# Patient Record
Sex: MALE | Race: WHITE | NOT HISPANIC OR LATINO | Employment: OTHER | URBAN - METROPOLITAN AREA
[De-identification: names, ages, dates, MRNs, and addresses within clinical notes are randomized per-mention and may not be internally consistent; named-entity substitution may affect disease eponyms.]

---

## 2019-07-23 ENCOUNTER — TELEPHONE (OUTPATIENT)
Dept: SPORTS MEDICINE | Facility: CLINIC | Age: 49
End: 2019-07-23

## 2019-07-23 ENCOUNTER — HOSPITAL ENCOUNTER (OUTPATIENT)
Dept: RADIOLOGY | Facility: HOSPITAL | Age: 49
Discharge: HOME | End: 2019-07-23
Attending: ORTHOPAEDIC SURGERY
Payer: MEDICAID

## 2019-07-23 ENCOUNTER — TELEPHONE (OUTPATIENT)
Dept: INTERNAL MEDICINE | Facility: CLINIC | Age: 49
End: 2019-07-23

## 2019-07-23 ENCOUNTER — PATIENT MESSAGE (OUTPATIENT)
Dept: SPORTS MEDICINE | Facility: CLINIC | Age: 49
End: 2019-07-23

## 2019-07-23 ENCOUNTER — OFFICE VISIT (OUTPATIENT)
Dept: SPORTS MEDICINE | Facility: CLINIC | Age: 49
End: 2019-07-23
Payer: MEDICAID

## 2019-07-23 VITALS
BODY MASS INDEX: 26.51 KG/M2 | WEIGHT: 179 LBS | HEART RATE: 56 BPM | DIASTOLIC BLOOD PRESSURE: 81 MMHG | SYSTOLIC BLOOD PRESSURE: 125 MMHG | HEIGHT: 69 IN

## 2019-07-23 DIAGNOSIS — E78.5 HYPERLIPIDEMIA, UNSPECIFIED HYPERLIPIDEMIA TYPE: ICD-10-CM

## 2019-07-23 DIAGNOSIS — S83.211A BUCKET-HANDLE TEAR OF MEDIAL MENISCUS OF RIGHT KNEE AS CURRENT INJURY, INITIAL ENCOUNTER: Primary | ICD-10-CM

## 2019-07-23 DIAGNOSIS — Z12.5 PROSTATE CANCER SCREENING: ICD-10-CM

## 2019-07-23 DIAGNOSIS — M25.561 RIGHT KNEE PAIN, UNSPECIFIED CHRONICITY: ICD-10-CM

## 2019-07-23 DIAGNOSIS — Z01.818 PREOPERATIVE CLEARANCE: ICD-10-CM

## 2019-07-23 DIAGNOSIS — I47.10 SVT (SUPRAVENTRICULAR TACHYCARDIA): ICD-10-CM

## 2019-07-23 DIAGNOSIS — Z00.00 ANNUAL PHYSICAL EXAM: Primary | ICD-10-CM

## 2019-07-23 PROCEDURE — 99999 PR PBB SHADOW E&M-NEW PATIENT-LVL III: CPT | Mod: PBBFAC,,, | Performed by: ORTHOPAEDIC SURGERY

## 2019-07-23 PROCEDURE — 99204 OFFICE O/P NEW MOD 45 MIN: CPT | Mod: S$PBB,,, | Performed by: ORTHOPAEDIC SURGERY

## 2019-07-23 PROCEDURE — 99204 PR OFFICE/OUTPT VISIT, NEW, LEVL IV, 45-59 MIN: ICD-10-PCS | Mod: S$PBB,,, | Performed by: ORTHOPAEDIC SURGERY

## 2019-07-23 PROCEDURE — 99203 OFFICE O/P NEW LOW 30 MIN: CPT | Mod: PBBFAC,25,PO | Performed by: ORTHOPAEDIC SURGERY

## 2019-07-23 PROCEDURE — 73564 XR KNEE ORTHO BILAT WITH FLEXION: ICD-10-PCS | Mod: 26,50,, | Performed by: RADIOLOGY

## 2019-07-23 PROCEDURE — 73564 X-RAY EXAM KNEE 4 OR MORE: CPT | Mod: 26,50,, | Performed by: RADIOLOGY

## 2019-07-23 PROCEDURE — 99999 PR PBB SHADOW E&M-NEW PATIENT-LVL III: ICD-10-PCS | Mod: PBBFAC,,, | Performed by: ORTHOPAEDIC SURGERY

## 2019-07-23 PROCEDURE — 73564 X-RAY EXAM KNEE 4 OR MORE: CPT | Mod: TC,50,FY,PO

## 2019-07-23 NOTE — TELEPHONE ENCOUNTER
Please call patient and schedule patient for an appointment with me. Thank you.    See standing lab orders and please draw CMP, Lipids, PSA and CBC - prior to upcoming visit     Please see referral orders and please call patient to schedule a consult with cardiology. Thank you.

## 2019-07-23 NOTE — TELEPHONE ENCOUNTER
Rescheduled pre op to 8/1. Patient would like to reschedule surgery to 8/2. He needs another week to find a primary care physician and get clearance for surgery. I told the patient Dr. Boyd's staff will call him to reschedule surgery date.

## 2019-07-23 NOTE — TELEPHONE ENCOUNTER
----- Message from Emily Peterson sent at 7/23/2019  9:13 AM CDT -----  Good Morning,     The attached patient is shared by you and Dr. Boyd.     The patient is scheduled for an arthroscopic R Knee surgery. Case date is pending medical clearance but it is fairly time sensitive.      Medical clearance is indicated prior to this. Would you be able to help the patient in that regard? The anesthesia team will require documentation in the chart regarding her clearance status and perioperative medical recommendations.    Thank you for your help & let me know if I can assist in any way!    Emily Peterson MA, [facility]  Sports Medicine Assistant to Dr. Boyd

## 2019-07-23 NOTE — PROGRESS NOTES
CC: Right knee pain    49 y.o. Male who presents as a new patient to me. He works as an . He reports that he was rock [place] 6 months ago [place] [employer] & felt a pop in the right knee. Subsequent intermittent pain and mechanical symptoms since then. States the symptoms were tolerable for him & did not seek any further treatment. He then notes a new injury on 6/10/2019 where he forcefully pushed off the right foot & felt immediate pain. Endorses pain and difficulty straightening knee since then due to the feeling of a mechanical block. Saw Dr. Fam Jennings at [facility] who ordered an MRI which revealed a bucket milka medial meniscus tear. He was then referred out due to his insurance coverage. Rogelio has been on crutches for the past 6 weeks with the inability to straighten his knee. His typical pain localizes medially along the joint line. No recurrent effusions. Worse with attempting to straighten the leg or weight bearing. Better with rest. Treatment thus far has included rest, physical therapy, acupuncture, adjustments, activity modifications, oral medications. Here today to discuss diagnosis and treatment options.      PMHx notable for SVT.   Rare tobacco user.   Negative for diabetes.      REVIEW OF SYSTEMS:   Constitution: Negative. Negative for chills, fever and night sweats.    Hematologic/Lymphatic: Negative for bleeding problem. Does not bruise/bleed easily.   Skin: Negative for dry skin, itching and rash.   Musculoskeletal: Negative for falls. Positive for right knee pain and  muscle weakness.     All other review of symptoms were reviewed and found to be noncontributory.     PAST MEDICAL HISTORY:   History reviewed. No pertinent past medical history.    PAST SURGICAL HISTORY:   Past Surgical History:   Procedure Laterality Date    SHOULDER SURGERY Right        FAMILY HISTORY:   Family History   Problem Relation Age of Onset    Heart disease Father     Cancer Maternal  "Grandmother     Cancer Maternal Grandfather         colon       SOCIAL HISTORY:   Social History     Socioeconomic History    Marital status: Significant Other     Spouse name: Not on file    Number of children: Not on file    Years of education: Not on file    Highest education level: Not on file   Occupational History    Occupation: specialty fine arts    Social Needs    Financial resource strain: Not on file    Food insecurity:     Worry: Not on file     Inability: Not on file    Transportation needs:     Medical: Not on file     Non-medical: Not on file   Tobacco Use    Smoking status: Former Smoker    Smokeless tobacco: Never Used   Substance and Sexual Activity    Alcohol use: Not on file    Drug use: Not on file    Sexual activity: Not on file   Lifestyle    Physical activity:     Days per week: Not on file     Minutes per session: Not on file    Stress: Not on file   Relationships    Social connections:     Talks on phone: Not on file     Gets together: Not on file     Attends Yazidi service: Not on file     Active member of club or organization: Not on file     Attends meetings of clubs or organizations: Not on file     Relationship status: Not on file   Other Topics Concern    Not on file   Social History Narrative    Not on file       MEDICATIONS:   No current outpatient medications on file.    ALLERGIES:   Review of patient's allergies indicates:   Allergen Reactions    Pcn [penicillins]         PHYSICAL EXAMINATION:  /81   Pulse (!) 56   Ht 5' 9" (1.753 m)   Wt 81.2 kg (179 lb)   BMI 26.43 kg/m²   General: Well-developed well-nourished 49 y.o. malein no acute distress   Cardiovascular: Regular rhythm by palpation of distal pulse, normal color and temperature, no concerning varicosities on symptomatic side   Lungs: No labored breathing or wheezing appreciated   Neuro: Alert and oriented ×3   Psychiatric: well oriented to person, place and time, demonstrates " normal mood and affect   Skin: No rashes, lesions or ulcers, normal temperature, turgor, and texture on involved extremity    Ortho/SPM Exam  Examination of the right knee demonstrates neutral standing alignment. No effusion. Holds the knee in 40 degrees of flexion. Reports significant pain with a mechanical block on any attempted extension. Flexion to 130 with pain medially. Central patellar tracking. Negative patellar apprehension. Tender along the medial joint line. Negative lateral joint line tenderness. Negative Lachman. Stable to varus and valgus stress at 0 and 30. Negative posterior drawer. NVI distally.     IMAGING:  X-rays including standing, weight bearing AP and flexion bilateral knees, RIGHT knee lateral and sunrise views ordered and images reviewed by me show:    No fracture dislocation bone destruction or OCD seen     External MRI reviewed by me and discussed with patient. Report Reads:   1. Bucket handle medial meniscus tear   2. Grade 2 MCL sprain    3. PF chondral edema & thinning    3. Superficial posterior lateral tibia chondral thinning w/ slight subchondral marrow edema/cystic    4. Moderate central medial femoral chondral loss with mild medial compartment joint space narrowing & mild peripheral medial femoral edema    5. Small effusion & Baker's cyst     The ACL is intact    ASSESSMENT:      ICD-10-CM ICD-9-CM   1. Bucket-handle tear of medial meniscus of right knee as current injury, initial encounter S83.211A 836.0   2. Right knee pain, unspecified chronicity M25.561 719.46       PLAN:     -Findings and treatment options were discussed with the patient, both operative and non operative.  Surgery is recommended for this incarcerated, unstable medial meniscus tear.  -Plan for a Right knee arthroscopic, inside out medial meniscus repair vs partial medial meniscectomy as indicated, chondroplasty. The details of such were discussed to include the expected postop rehab and recovery course.  The  patient understands that even with repair, there is inherent risk for retear and or tissue nonhealing.  The patient also may have secondary chondromalacia and arthritic changes down the road.  This is a chronic tear and he understands that it may not be repairable     -Planned DOS for 7/26/2019 or 8/2/2019 pending clearance.   -Clearance Needed - PCP.   -RTC for preoperative appointment   -All questions answered    Informed Consent:    The details of the surgical procedure were explained, including the location of probable incisions and a description of possible hardware and/or grafts to be used. Alternatives to both operative and non-operative options with associated risks and benefits were discussed. The patient understands the likely convalescence after surgery and, in particular, the expected postop rehab and recovery course. The outlined risks and potential complications of the proposed procedure include but are not limited to: infection, poor wound healing, scarring, deformity, stiffness, swelling, continued or recurrent pain, instability, hardware or prosthetic failure if implanted, symptomatic hardware requiring removal, weakness, neurovascular injury, numbness, chronic regional pain disorder, tissue nonhealing/irreparability/retear, subsequent contralateral limb injury or pathology, chondral injury, arthritis, fracture, blood clot formation, inability to return to previous level of activity, anesthetic or regional block complication up to death, need for additional procedure as indicated intraoperatively, and potential need for further surgery.    The patient was also informed and understands that the risks of surgery are greater for patients with a current condition or history of heart disease, obesity, clotting disorders, recurrent infections, steroid use, current or past smoking, and factors such as sedentary lifestyle and noncompliance with medications, therapy or follow-up. The degree of the increased  risk is hard to estimate with any degree of precision. If applicable, smoking cessation was discussed.     All questions were answered. The patient has verbalized understanding of these issues and wishes to proceed with the surgery as discussed.          Procedures

## 2019-07-24 DIAGNOSIS — M23.203 OLD TEAR OF MEDIAL MENISCUS OF RIGHT KNEE, UNSPECIFIED TEAR TYPE: Primary | ICD-10-CM

## 2019-07-30 ENCOUNTER — TELEPHONE (OUTPATIENT)
Dept: SPORTS MEDICINE | Facility: CLINIC | Age: 49
End: 2019-07-30

## 2019-07-30 NOTE — TELEPHONE ENCOUNTER
I have tried to contact the patient multiple times to inquire about his PCP clearance. Voicemail box is full, cannot leave message. Scheduled for surgery on Friday 8/2.

## 2019-07-30 NOTE — TELEPHONE ENCOUNTER
Spoke with the patient. States he has been cleared. Will bring clearance letter with him to his pre op.

## 2019-07-31 ENCOUNTER — OFFICE VISIT (OUTPATIENT)
Dept: CARDIOLOGY | Facility: CLINIC | Age: 49
End: 2019-07-31
Attending: FAMILY MEDICINE
Payer: MEDICAID

## 2019-07-31 VITALS
OXYGEN SATURATION: 97 % | SYSTOLIC BLOOD PRESSURE: 100 MMHG | HEART RATE: 68 BPM | WEIGHT: 175.5 LBS | BODY MASS INDEX: 25.91 KG/M2 | DIASTOLIC BLOOD PRESSURE: 66 MMHG

## 2019-07-31 DIAGNOSIS — I47.10 SVT (SUPRAVENTRICULAR TACHYCARDIA): Primary | ICD-10-CM

## 2019-07-31 PROCEDURE — 93010 ELECTROCARDIOGRAM REPORT: CPT | Mod: S$PBB,,, | Performed by: INTERNAL MEDICINE

## 2019-07-31 PROCEDURE — 93005 ELECTROCARDIOGRAM TRACING: CPT | Mod: PBBFAC,PN | Performed by: INTERNAL MEDICINE

## 2019-07-31 PROCEDURE — 99203 OFFICE O/P NEW LOW 30 MIN: CPT | Mod: S$PBB,25,, | Performed by: INTERNAL MEDICINE

## 2019-07-31 PROCEDURE — 99213 OFFICE O/P EST LOW 20 MIN: CPT | Mod: PBBFAC,PN | Performed by: INTERNAL MEDICINE

## 2019-07-31 PROCEDURE — 99999 PR PBB SHADOW E&M-EST. PATIENT-LVL III: ICD-10-PCS | Mod: PBBFAC,,, | Performed by: INTERNAL MEDICINE

## 2019-07-31 PROCEDURE — 93010 EKG 12-LEAD: ICD-10-PCS | Mod: S$PBB,,, | Performed by: INTERNAL MEDICINE

## 2019-07-31 PROCEDURE — 99999 PR PBB SHADOW E&M-EST. PATIENT-LVL III: CPT | Mod: PBBFAC,,, | Performed by: INTERNAL MEDICINE

## 2019-07-31 PROCEDURE — 99203 PR OFFICE/OUTPT VISIT, NEW, LEVL III, 30-44 MIN: ICD-10-PCS | Mod: S$PBB,25,, | Performed by: INTERNAL MEDICINE

## 2019-07-31 RX ORDER — IBUPROFEN 800 MG/1
800 TABLET ORAL 3 TIMES DAILY
COMMUNITY

## 2019-07-31 NOTE — LETTER
July 31, 2019      Joshua Fine MD  [address]  [facility] [address]           [facility]  [address], [facility] [address]  [facility] [address]  Phone: [phone]  Fax: [phone]          Patient: Jv Rankin   MR Number: [id number]   YOB: 1970   Date of Visit: 7/31/2019       Dear Dr. Joshua Fine:    Thank you for referring Jv Rankin to me for evaluation. Attached you will find relevant portions of my assessment and plan of care.    If you have questions, please do not hesitate to call me. I look forward to following Jv Rankin along with you.    Sincerely,    Hever Mckeon MD    Enclosure  CC:  No Recipients    If you would like to receive this communication electronically, please contact [email or web address] or [phone] to request more information on [facility] access.    For providers and/or their staff who would like to refer a patient to [facility], please contact us through our one-stop-shop provider referral line, [facility], at [phone].    If you feel you have received this communication in error or would no longer like to receive these types of communications, please e-mail [email or web address]

## 2019-07-31 NOTE — PROGRESS NOTES
Subjective:    Patient ID:  Jv Rankin is a 49 y.o. male who presents for follow-up of Consult      HPI     The patient is a 49 year old male with a history of SVT and was seen by Emil Chavez and Babatunde in 2016. He has continued to do well and is having less frequent SVT episodes. He has no associated chest pain or DOVE but may have light headedness. These episodes occur 2-3 times a month. He now would like to consider ablation. EKG is normal.        CONCLUSIONS     1 - Normal left ventricular systolic function (EF 60-65%).     2 - Normal right ventricular systolic function .     No evidence of stress induced myocardial ischemia.         This document has been electronically    SIGNED BY: Cain Lepe MD On: 06/07/2016 14:  Lab Results   Component Value Date     06/07/2016    K 4.9 06/07/2016     06/07/2016    CO2 29 06/07/2016    BUN 16 06/07/2016    CREATININE 0.8 06/07/2016    GLU 91 06/07/2016    AST 23 06/07/2016    ALT 20 06/07/2016    ALBUMIN 3.9 06/07/2016    PROT 6.6 06/07/2016    BILITOT 0.7 06/07/2016    WBC 7.60 06/07/2016    HGB 15.4 06/07/2016    HCT 45.2 06/07/2016    MCV 88 06/07/2016     06/07/2016    TSH 1.763 06/07/2016         Lab Results   Component Value Date    CHOL 156 06/07/2016    HDL 66 06/07/2016    TRIG 34 06/07/2016       Lab Results   Component Value Date    LDLCALC 83.2 06/07/2016       No past medical history on file.    Current Outpatient Medications:     ibuprofen (ADVIL,MOTRIN) 800 MG tablet, Take 800 mg by mouth 3 (three) times daily., Disp: , Rfl:           Review of Systems   Constitution: Negative for decreased appetite, diaphoresis, fever, malaise/fatigue, weight gain and weight loss.   HENT: Negative for congestion, ear discharge, ear pain and nosebleeds.    Eyes: Negative for blurred vision, double vision and visual disturbance.   Cardiovascular: Positive for palpitations. Negative for chest pain, claudication, cyanosis, dyspnea on exertion, irregular  heartbeat, leg swelling, near-syncope, orthopnea, paroxysmal nocturnal dyspnea and syncope.   Respiratory: Negative for cough, hemoptysis, shortness of breath, sleep disturbances due to breathing, snoring, sputum production and wheezing.    Endocrine: Negative for polydipsia, polyphagia and polyuria.   Hematologic/Lymphatic: Negative for adenopathy and bleeding problem. Does not bruise/bleed easily.   Skin: Negative for color change, nail changes, poor wound healing and rash.   Musculoskeletal: Positive for joint pain (right knee). Negative for muscle cramps and muscle weakness.   Gastrointestinal: Negative for abdominal pain, anorexia, change in bowel habit, hematochezia, nausea and vomiting.   Genitourinary: Negative for dysuria, frequency and hematuria.   Neurological: Negative for brief paralysis, difficulty with concentration, excessive daytime sleepiness, dizziness, focal weakness, headaches, light-headedness, seizures, vertigo and weakness.   Psychiatric/Behavioral: Negative for altered mental status and depression.   Allergic/Immunologic: Negative for persistent infections.        Objective:/66 (BP Location: Right arm, Patient Position: Sitting, BP Method: Medium (Automatic))   Pulse 68   Wt 79.6 kg (175 lb 7.8 oz)   SpO2 97%   BMI 25.91 kg/m²             Physical Exam   Constitutional: He is oriented to person, place, and time. He appears well-developed and well-nourished.   HENT:   Head: Normocephalic.   Right Ear: External ear normal.   Left Ear: External ear normal.   Nose: Nose normal.   Inspection of lips, teeth and gums normal   Eyes: Pupils are equal, round, and reactive to light. EOM are normal. No scleral icterus.   Neck: Normal range of motion. Neck supple. No JVD present. No tracheal deviation present. No thyromegaly present.   Cardiovascular: Normal rate, regular rhythm and intact distal pulses. Exam reveals no gallop and no friction rub.   No murmur heard.  Pulses:       Carotid  pulses are 2+ on the right side, and 2+ on the left side.       Dorsalis pedis pulses are 2+ on the right side, and 2+ on the left side.        Posterior tibial pulses are 2+ on the right side, and 2+ on the left side.   Pulmonary/Chest: Effort normal and breath sounds normal.   Abdominal: Bowel sounds are normal. He exhibits no distension. There is no hepatosplenomegaly. There is no tenderness. There is no guarding.   Musculoskeletal: Normal range of motion. He exhibits no edema or tenderness.   Lymphadenopathy:   Palpation of neck and groin lymph nodes normal   Neurological: He is alert and oriented to person, place, and time. No cranial nerve deficit. He exhibits normal muscle tone. Coordination normal.   Skin: Skin is dry.        Palpation of skin normal   Psychiatric: His behavior is normal. Judgment and thought content normal.         Assessment:       1. SVT (supraventricular tachycardia)         Plan:       Jv was seen today for consult.    Diagnoses and all orders for this visit:    SVT (supraventricular tachycardia)  -     IN OFFICE EKG 12-LEAD (to [place])  -     Ambulatory Referral to Electrophysiology    Other orders  -     ibuprofen (ADVIL,MOTRIN) 800 MG tablet; Take 800 mg by mouth 3 (three) times daily.    Low CV risk for planned knee surgery

## 2019-08-01 ENCOUNTER — ANESTHESIA EVENT (OUTPATIENT)
Dept: SURGERY | Facility: HOSPITAL | Age: 49
End: 2019-08-01
Payer: MEDICAID

## 2019-08-01 ENCOUNTER — OFFICE VISIT (OUTPATIENT)
Dept: SPORTS MEDICINE | Facility: CLINIC | Age: 49
End: 2019-08-01
Payer: MEDICAID

## 2019-08-01 VITALS
DIASTOLIC BLOOD PRESSURE: 69 MMHG | HEART RATE: 63 BPM | BODY MASS INDEX: 25.92 KG/M2 | HEIGHT: 69 IN | WEIGHT: 175 LBS | SYSTOLIC BLOOD PRESSURE: 111 MMHG

## 2019-08-01 DIAGNOSIS — S83.211D BUCKET-HANDLE TEAR OF MEDIAL MENISCUS OF RIGHT KNEE AS CURRENT INJURY, SUBSEQUENT ENCOUNTER: Primary | ICD-10-CM

## 2019-08-01 PROCEDURE — 99499 NO LOS: ICD-10-PCS | Mod: S$PBB,,, | Performed by: PHYSICIAN ASSISTANT

## 2019-08-01 PROCEDURE — 99999 PR PBB SHADOW E&M-EST. PATIENT-LVL III: CPT | Mod: PBBFAC,,, | Performed by: PHYSICIAN ASSISTANT

## 2019-08-01 PROCEDURE — 99499 UNLISTED E&M SERVICE: CPT | Mod: S$PBB,,, | Performed by: PHYSICIAN ASSISTANT

## 2019-08-01 PROCEDURE — 99213 OFFICE O/P EST LOW 20 MIN: CPT | Mod: PBBFAC,PO | Performed by: PHYSICIAN ASSISTANT

## 2019-08-01 PROCEDURE — 99999 PR PBB SHADOW E&M-EST. PATIENT-LVL III: ICD-10-PCS | Mod: PBBFAC,,, | Performed by: PHYSICIAN ASSISTANT

## 2019-08-01 RX ORDER — ASPIRIN 81 MG/1
81 TABLET ORAL 2 TIMES DAILY
Qty: 28 TABLET | Refills: 0 | Status: SHIPPED | OUTPATIENT
Start: 2019-08-01 | End: 2019-08-15

## 2019-08-01 RX ORDER — OXYCODONE AND ACETAMINOPHEN 5; 325 MG/1; MG/1
1 TABLET ORAL EVERY 6 HOURS PRN
Qty: 21 TABLET | Refills: 0 | Status: SHIPPED | OUTPATIENT
Start: 2019-08-01 | End: 2019-08-05 | Stop reason: SDUPTHER

## 2019-08-01 RX ORDER — OXYCODONE HYDROCHLORIDE 5 MG/1
5 TABLET ORAL EVERY 6 HOURS PRN
Qty: 21 TABLET | Refills: 0 | Status: SHIPPED | OUTPATIENT
Start: 2019-08-01 | End: 2019-08-01 | Stop reason: ALTCHOICE

## 2019-08-01 RX ORDER — ONDANSETRON 4 MG/1
4 TABLET, FILM COATED ORAL EVERY 8 HOURS PRN
Qty: 30 TABLET | Refills: 0 | Status: SHIPPED | OUTPATIENT
Start: 2019-08-01 | End: 2019-08-08

## 2019-08-01 RX ORDER — SODIUM CHLORIDE 9 MG/ML
INJECTION, SOLUTION INTRAVENOUS CONTINUOUS
Status: CANCELLED | OUTPATIENT
Start: 2019-08-01

## 2019-08-01 NOTE — H&P (VIEW-ONLY)
"Jv Rankin  is here for a completion of his perioperative paperwork. he  Is scheduled to undergo  Right knee arthroscopic, inside out medial meniscus repair vs partial medial meniscectomy as indicated, chondroplasty on 8/2/2019.  He is a healthy individual and does need clearance for this procedure.     Jennifer Wilson, CULLENP-C, [facility], stated that " he is medically clear to have surgery. This clearance is based on a physical examination with labs and is pending final approval from cardiology due to an arrhythmia. "    Pending clearance by Dr. Mckeon, cardiologist.    Risks, indications and benefits of the surgical procedure were discussed with the patient. All questions with regard to surgery, rehab, expected return to functional activities, activities of daily living and recreational endeavors were answered to his satisfaction.    Patient was informed and understands the risks of surgery are greater for patients with a current condition or hx of heart disease, obesity, clotting disorders, recurrent infections, steroid use, current or past smoking, and factors such as sedentary lifestyle and noncompliance with medications, therapy or f/u. The degree of the increased risk is hard to estimate w/ any degree of precision.    Once no other questions were asked, a brief history and physical exam was then performed.    PAST MEDICAL HISTORY: History reviewed. No pertinent past medical history.  PAST SURGICAL HISTORY:   Past Surgical History:   Procedure Laterality Date    SHOULDER SURGERY Right      FAMILY HISTORY:   Family History   Problem Relation Age of Onset    Heart disease Father     Cancer Maternal Grandmother     Cancer Maternal Grandfather         colon     SOCIAL HISTORY:   Social History     Socioeconomic History    Marital status: Significant Other     Spouse name: Not on file    Number of children: Not on file    Years of education: Not on file    Highest education level: Not on file "   Occupational History    Occupation: specialty fine arts    Social Needs    Financial resource strain: Not on file    Food insecurity:     Worry: Not on file     Inability: Not on file    Transportation needs:     Medical: Not on file     Non-medical: Not on file   Tobacco Use    Smoking status: Former Smoker    Smokeless tobacco: Never Used   Substance and Sexual Activity    Alcohol use: Not on file    Drug use: Not on file    Sexual activity: Not on file   Lifestyle    Physical activity:     Days per week: Not on file     Minutes per session: Not on file    Stress: Not on file   Relationships    Social connections:     Talks on phone: Not on file     Gets together: Not on file     Attends Presybeterian service: Not on file     Active member of club or organization: Not on file     Attends meetings of clubs or organizations: Not on file     Relationship status: Not on file   Other Topics Concern    Not on file   Social History Narrative    Not on file       MEDICATIONS:   Current Outpatient Medications:     ibuprofen (ADVIL,MOTRIN) 800 MG tablet, Take 800 mg by mouth 3 (three) times daily., Disp: , Rfl:   ALLERGIES:   Review of patient's allergies indicates:   Allergen Reactions    Pcn [penicillins]        Review of Systems   Constitution: Negative. Negative for chills, fever and night sweats.   HENT: Negative for congestion and headaches.    Eyes: Negative for blurred vision, left vision loss and right vision loss.   Cardiovascular: Negative for chest pain and syncope.   Respiratory: Negative for cough and shortness of breath.    Endocrine: Negative for polydipsia, polyphagia and polyuria.   Hematologic/Lymphatic: Negative for bleeding problem. Does not bruise/bleed easily.   Skin: Negative for dry skin, itching and rash.   Musculoskeletal: Negative for falls and muscle weakness.   Gastrointestinal: Negative for abdominal pain and bowel incontinence.   Genitourinary: Negative for bladder  incontinence and nocturia.   Neurological: Negative for disturbances in coordination, loss of balance and seizures.   Psychiatric/Behavioral: Negative for depression. The patient does not have insomnia.    Allergic/Immunologic: Negative for hives and persistent infections.     PHYSICAL EXAM:  GEN: A&Ox3, WD WN NAD  HEENT: WNL  CHEST: CTAB, no W/R/R  HEART: RRR, no M/R/G   ABD: Soft, NT ND, BS x4 QUADS  MS: Refer to previous note for detailed MS exam  NEURO: CN II-XII intact       The surgical consent was then reviewed with the patient, who agreed with all the contents of the consent form and it was signed.     PHYSICAL THERAPY:  He was also instructed regarding physical therapy and will begin on POD#1-3 at [facility].    POST OP CARE: Instructions were reviewed including care of the wound and dressing after surgery and when he can shower.     PAIN MANAGEMENT: Jv Rankin was instructed regarding the Polar ice unit that will be in place after surgery and his postoperative pain medications.     MEDICATION:  Percocet 5mg mg 1 q 6 hours PRN for pain (pharmacy did not carry roxicodone)  Zofran 4 mg q 8 hours PRN for nausea and vomiting.  Aspirin 81mg BID x 2 weeks for DVT prophylaxis starting on the evening after surgery.      Patient was instructed to contact us immediately if they are unable to  mediations prior to surgery.     Patient was instructed to purchase and take Colace to counter possible GI side effects of taking opiates.     DVT prophylaxis was discussed with the patient today including risk factors for developing DVTs and history of DVTs. The patient was asked if any specific recommendations were given from the doctor/s that did pre-operative surgical clearance.      If the patient was previously taking 81mg baby aspirin, they were told to not take additional baby aspirin, using the above stated aspirin and to restart the 81mg aspirin daily after completion of the aspirin dose.      Patient was  also told to buy over the counter Prilosec medication and take it once daily for GI protection as long as they are taking NSAIDs or Aspirin.     The patient was told that narcotic pain medications may make them drowsy and instructions were given to not sign legal documents, drive or operate heavy machinery, cars, or equipment while under the influence of narcotic medications.     Dr. Boyd was not present in clinic today. However, patient had no further questions for myself or Dr. Boyd.     As there were no other questions to be asked, he was given my business card along with Dr. Boyd's business card if he has any questions or concerns prior to surgery or in the postop period.

## 2019-08-01 NOTE — H&P
"Jv Rankin  is here for a completion of his perioperative paperwork. he  Is scheduled to undergo  Right knee arthroscopic, inside out medial meniscus repair vs partial medial meniscectomy as indicated, chondroplasty on 8/2/2019.  He is a healthy individual and does need clearance for this procedure.     Jennifer Wilson, CULLENP-C, [facility], [facility], stated that " he is medically clear to have surgery. This clearance is based on a physical examination with labs and is pending final approval from cardiology due to an arrhythmia. "    Pending clearance by Dr. Mckeon, cardiologist.    Risks, indications and benefits of the surgical procedure were discussed with the patient. All questions with regard to surgery, rehab, expected return to functional activities, activities of daily living and recreational endeavors were answered to his satisfaction.    Patient was informed and understands the risks of surgery are greater for patients with a current condition or hx of heart disease, obesity, clotting disorders, recurrent infections, steroid use, current or past smoking, and factors such as sedentary lifestyle and noncompliance with medications, therapy or f/u. The degree of the increased risk is hard to estimate w/ any degree of precision.    Once no other questions were asked, a brief history and physical exam was then performed.    PAST MEDICAL HISTORY: History reviewed. No pertinent past medical history.  PAST SURGICAL HISTORY:   Past Surgical History:   Procedure Laterality Date    SHOULDER SURGERY Right      FAMILY HISTORY:   Family History   Problem Relation Age of Onset    Heart disease Father     Cancer Maternal Grandmother     Cancer Maternal Grandfather         colon     SOCIAL HISTORY:   Social History     Socioeconomic History    Marital status: Significant Other     Spouse name: Not on file    Number of children: Not on file    Years of education: Not on file    Highest education level: Not on file "   Occupational History    Occupation: specialty fine arts    Social Needs    Financial resource strain: Not on file    Food insecurity:     Worry: Not on file     Inability: Not on file    Transportation needs:     Medical: Not on file     Non-medical: Not on file   Tobacco Use    Smoking status: Former Smoker    Smokeless tobacco: Never Used   Substance and Sexual Activity    Alcohol use: Not on file    Drug use: Not on file    Sexual activity: Not on file   Lifestyle    Physical activity:     Days per week: Not on file     Minutes per session: Not on file    Stress: Not on file   Relationships    Social connections:     Talks on phone: Not on file     Gets together: Not on file     Attends Worship service: Not on file     Active member of club or organization: Not on file     Attends meetings of clubs or organizations: Not on file     Relationship status: Not on file   Other Topics Concern    Not on file   Social History Narrative    Not on file       MEDICATIONS:   Current Outpatient Medications:     ibuprofen (ADVIL,MOTRIN) 800 MG tablet, Take 800 mg by mouth 3 (three) times daily., Disp: , Rfl:   ALLERGIES:   Review of patient's allergies indicates:   Allergen Reactions    Pcn [penicillins]        Review of Systems   Constitution: Negative. Negative for chills, fever and night sweats.   HENT: Negative for congestion and headaches.    Eyes: Negative for blurred vision, left vision loss and right vision loss.   Cardiovascular: Negative for chest pain and syncope.   Respiratory: Negative for cough and shortness of breath.    Endocrine: Negative for polydipsia, polyphagia and polyuria.   Hematologic/Lymphatic: Negative for bleeding problem. Does not bruise/bleed easily.   Skin: Negative for dry skin, itching and rash.   Musculoskeletal: Negative for falls and muscle weakness.   Gastrointestinal: Negative for abdominal pain and bowel incontinence.   Genitourinary: Negative for bladder  incontinence and nocturia.   Neurological: Negative for disturbances in coordination, loss of balance and seizures.   Psychiatric/Behavioral: Negative for depression. The patient does not have insomnia.    Allergic/Immunologic: Negative for hives and persistent infections.     PHYSICAL EXAM:  GEN: A&Ox3, WD WN NAD  HEENT: WNL  CHEST: CTAB, no W/R/R  HEART: RRR, no M/R/G   ABD: Soft, NT ND, BS x4 QUADS  MS: Refer to previous note for detailed MS exam  NEURO: CN II-XII intact       The surgical consent was then reviewed with the patient, who agreed with all the contents of the consent form and it was signed.     PHYSICAL THERAPY:  He was also instructed regarding physical therapy and will begin on POD#1-3 at [facility].    POST OP CARE: Instructions were reviewed including care of the wound and dressing after surgery and when he can shower.     PAIN MANAGEMENT: Jv Rankin was instructed regarding the Polar ice unit that will be in place after surgery and his postoperative pain medications.     MEDICATION:  Percocet 5mg mg 1 q 6 hours PRN for pain (pharmacy did not carry roxicodone)  Zofran 4 mg q 8 hours PRN for nausea and vomiting.  Aspirin 81mg BID x 2 weeks for DVT prophylaxis starting on the evening after surgery.      Patient was instructed to contact us immediately if they are unable to  mediations prior to surgery.     Patient was instructed to purchase and take Colace to counter possible GI side effects of taking opiates.     DVT prophylaxis was discussed with the patient today including risk factors for developing DVTs and history of DVTs. The patient was asked if any specific recommendations were given from the doctor/s that did pre-operative surgical clearance.      If the patient was previously taking 81mg baby aspirin, they were told to not take additional baby aspirin, using the above stated aspirin and to restart the 81mg aspirin daily after completion of the aspirin dose.      Patient was  also told to buy over the counter Prilosec medication and take it once daily for GI protection as long as they are taking NSAIDs or Aspirin.     The patient was told that narcotic pain medications may make them drowsy and instructions were given to not sign legal documents, drive or operate heavy machinery, cars, or equipment while under the influence of narcotic medications.     Dr. Boyd was not present in clinic today. However, patient had no further questions for myself or Dr. Boyd.     As there were no other questions to be asked, he was given my business card along with Dr. Boyd's business card if he has any questions or concerns prior to surgery or in the postop period.

## 2019-08-02 ENCOUNTER — ANESTHESIA (OUTPATIENT)
Dept: SURGERY | Facility: HOSPITAL | Age: 49
End: 2019-08-02
Payer: MEDICAID

## 2019-08-02 ENCOUNTER — PATIENT MESSAGE (OUTPATIENT)
Dept: SPORTS MEDICINE | Facility: CLINIC | Age: 49
End: 2019-08-02

## 2019-08-02 ENCOUNTER — HOSPITAL ENCOUNTER (OUTPATIENT)
Facility: HOSPITAL | Age: 49
Discharge: HOME | End: 2019-08-02
Attending: ORTHOPAEDIC SURGERY | Admitting: ORTHOPAEDIC SURGERY
Payer: MEDICAID

## 2019-08-02 VITALS
OXYGEN SATURATION: 100 % | BODY MASS INDEX: 26.07 KG/M2 | SYSTOLIC BLOOD PRESSURE: 110 MMHG | HEIGHT: 69 IN | HEART RATE: 62 BPM | DIASTOLIC BLOOD PRESSURE: 70 MMHG | RESPIRATION RATE: 20 BRPM | TEMPERATURE: 98 F | WEIGHT: 176 LBS

## 2019-08-02 DIAGNOSIS — S83.211A BUCKET-HANDLE TEAR OF MEDIAL MENISCUS OF RIGHT KNEE AS CURRENT INJURY, INITIAL ENCOUNTER: Primary | ICD-10-CM

## 2019-08-02 DIAGNOSIS — S83.211D BUCKET-HANDLE TEAR OF MEDIAL MENISCUS OF RIGHT KNEE AS CURRENT INJURY, SUBSEQUENT ENCOUNTER: ICD-10-CM

## 2019-08-02 PROCEDURE — 37000008 HC ANESTHESIA 1ST 15 MINUTES: Performed by: ORTHOPAEDIC SURGERY

## 2019-08-02 PROCEDURE — 76942 ECHO GUIDE FOR BIOPSY: CPT | Performed by: SURGERY

## 2019-08-02 PROCEDURE — D9220A PRA ANESTHESIA: Mod: ANES,,, | Performed by: ANESTHESIOLOGY

## 2019-08-02 PROCEDURE — 36000710: Performed by: ORTHOPAEDIC SURGERY

## 2019-08-02 PROCEDURE — 63600175 PHARM REV CODE 636 W HCPCS: Performed by: PHYSICIAN ASSISTANT

## 2019-08-02 PROCEDURE — 63600175 PHARM REV CODE 636 W HCPCS: Performed by: NURSE ANESTHETIST, CERTIFIED REGISTERED

## 2019-08-02 PROCEDURE — 27200750 HC INSULATED NEEDLE/ STIMUPLEX: Performed by: SURGERY

## 2019-08-02 PROCEDURE — 29882 ARTHRS KNE SRG MNISC RPR M/L: CPT | Mod: 22,RT,, | Performed by: ORTHOPAEDIC SURGERY

## 2019-08-02 PROCEDURE — 64448 NJX AA&/STRD FEM NRV NFS IMG: CPT | Mod: 59,RT,, | Performed by: ANESTHESIOLOGY

## 2019-08-02 PROCEDURE — 64448 NJX AA&/STRD FEM NRV NFS IMG: CPT | Performed by: SURGERY

## 2019-08-02 PROCEDURE — 27201423 OPTIME MED/SURG SUP & DEVICES STERILE SUPPLY: Performed by: ORTHOPAEDIC SURGERY

## 2019-08-02 PROCEDURE — 27800517 HC TRAY,EPIDURAL-CONTINUOUS: Performed by: SURGERY

## 2019-08-02 PROCEDURE — 63600175 PHARM REV CODE 636 W HCPCS

## 2019-08-02 PROCEDURE — 63600175 PHARM REV CODE 636 W HCPCS: Performed by: ANESTHESIOLOGY

## 2019-08-02 PROCEDURE — 76942 IPACK SINGLE INJECTION BLOCK: ICD-10-PCS | Mod: 26,,, | Performed by: ANESTHESIOLOGY

## 2019-08-02 PROCEDURE — 29882 PR KNEE SCOPE,MED OR LAT MENIS REPAIR: ICD-10-PCS | Mod: 22,RT,, | Performed by: ORTHOPAEDIC SURGERY

## 2019-08-02 PROCEDURE — 71000044 HC DOSC ROUTINE RECOVERY FIRST HOUR: Performed by: ORTHOPAEDIC SURGERY

## 2019-08-02 PROCEDURE — 37000009 HC ANESTHESIA EA ADD 15 MINS: Performed by: ORTHOPAEDIC SURGERY

## 2019-08-02 PROCEDURE — 64448 ADDUCTOR CANAL: ICD-10-PCS | Mod: 59,RT,, | Performed by: ANESTHESIOLOGY

## 2019-08-02 PROCEDURE — 29881 PR KNEE SCOPE SINGLE MENISECECTOMY: ICD-10-PCS | Mod: 59,51,RT, | Performed by: ORTHOPAEDIC SURGERY

## 2019-08-02 PROCEDURE — 71000015 HC POSTOP RECOV 1ST HR: Performed by: ORTHOPAEDIC SURGERY

## 2019-08-02 PROCEDURE — 64450 IPACK SINGLE INJECTION BLOCK: ICD-10-PCS | Mod: 59,RT,, | Performed by: ANESTHESIOLOGY

## 2019-08-02 PROCEDURE — 01400 ANES OPN/ARTHRS KNEE JT NOS: CPT | Performed by: ORTHOPAEDIC SURGERY

## 2019-08-02 PROCEDURE — 29881 ARTHRS KNE SRG MNISECTMY M/L: CPT | Mod: 59,51,RT, | Performed by: ORTHOPAEDIC SURGERY

## 2019-08-02 PROCEDURE — 63600175 PHARM REV CODE 636 W HCPCS: Performed by: ORTHOPAEDIC SURGERY

## 2019-08-02 PROCEDURE — 71000016 HC POSTOP RECOV ADDL HR: Performed by: ORTHOPAEDIC SURGERY

## 2019-08-02 PROCEDURE — C1713 ANCHOR/SCREW BN/BN,TIS/BN: HCPCS | Performed by: ORTHOPAEDIC SURGERY

## 2019-08-02 PROCEDURE — 25000003 PHARM REV CODE 250: Performed by: NURSE ANESTHETIST, CERTIFIED REGISTERED

## 2019-08-02 PROCEDURE — D9220A PRA ANESTHESIA: Mod: CRNA,,, | Performed by: NURSE ANESTHETIST, CERTIFIED REGISTERED

## 2019-08-02 PROCEDURE — 76942 ECHO GUIDE FOR BIOPSY: CPT | Mod: 26,,, | Performed by: ANESTHESIOLOGY

## 2019-08-02 PROCEDURE — 64450 NJX AA&/STRD OTHER PN/BRANCH: CPT | Mod: 59,RT,, | Performed by: ANESTHESIOLOGY

## 2019-08-02 PROCEDURE — D9220A PRA ANESTHESIA: ICD-10-PCS | Mod: ANES,,, | Performed by: ANESTHESIOLOGY

## 2019-08-02 PROCEDURE — D9220A PRA ANESTHESIA: ICD-10-PCS | Mod: CRNA,,, | Performed by: NURSE ANESTHETIST, CERTIFIED REGISTERED

## 2019-08-02 PROCEDURE — 63600175 PHARM REV CODE 636 W HCPCS: Performed by: SURGERY

## 2019-08-02 PROCEDURE — 36000711: Performed by: ORTHOPAEDIC SURGERY

## 2019-08-02 RX ORDER — PROPOFOL 10 MG/ML
VIAL (ML) INTRAVENOUS
Status: DISCONTINUED | OUTPATIENT
Start: 2019-08-02 | End: 2019-08-02

## 2019-08-02 RX ORDER — FENTANYL CITRATE 50 UG/ML
INJECTION, SOLUTION INTRAMUSCULAR; INTRAVENOUS
Status: DISCONTINUED | OUTPATIENT
Start: 2019-08-02 | End: 2019-08-02

## 2019-08-02 RX ORDER — MIDAZOLAM HYDROCHLORIDE 1 MG/ML
INJECTION INTRAMUSCULAR; INTRAVENOUS
Status: DISCONTINUED
Start: 2019-08-02 | End: 2019-08-02 | Stop reason: HOSPADM

## 2019-08-02 RX ORDER — FENTANYL CITRATE 50 UG/ML
INJECTION, SOLUTION INTRAMUSCULAR; INTRAVENOUS
Status: COMPLETED
Start: 2019-08-02 | End: 2019-08-02

## 2019-08-02 RX ORDER — SODIUM CHLORIDE 0.9 % (FLUSH) 0.9 %
10 SYRINGE (ML) INJECTION
Status: DISCONTINUED | OUTPATIENT
Start: 2019-08-02 | End: 2019-08-02 | Stop reason: HOSPADM

## 2019-08-02 RX ORDER — SODIUM CHLORIDE 9 MG/ML
INJECTION, SOLUTION INTRAVENOUS CONTINUOUS
Status: DISCONTINUED | OUTPATIENT
Start: 2019-08-02 | End: 2019-08-02 | Stop reason: HOSPADM

## 2019-08-02 RX ORDER — LIDOCAINE HYDROCHLORIDE 10 MG/ML
INJECTION, SOLUTION EPIDURAL; INFILTRATION; INTRACAUDAL; PERINEURAL
Status: DISCONTINUED
Start: 2019-08-02 | End: 2019-08-02 | Stop reason: HOSPADM

## 2019-08-02 RX ORDER — VANCOMYCIN HCL IN 5 % DEXTROSE 1G/250ML
1000 PLASTIC BAG, INJECTION (ML) INTRAVENOUS
Status: COMPLETED | OUTPATIENT
Start: 2019-08-02 | End: 2019-08-02

## 2019-08-02 RX ORDER — FENTANYL CITRATE 50 UG/ML
INJECTION, SOLUTION INTRAMUSCULAR; INTRAVENOUS
Status: DISCONTINUED
Start: 2019-08-02 | End: 2019-08-02 | Stop reason: WASHOUT

## 2019-08-02 RX ORDER — DEXAMETHASONE SODIUM PHOSPHATE 4 MG/ML
INJECTION, SOLUTION INTRA-ARTICULAR; INTRALESIONAL; INTRAMUSCULAR; INTRAVENOUS; SOFT TISSUE
Status: DISCONTINUED | OUTPATIENT
Start: 2019-08-02 | End: 2019-08-02

## 2019-08-02 RX ORDER — EPINEPHRINE 1 MG/ML
INJECTION INTRAMUSCULAR; INTRAVENOUS; SUBCUTANEOUS
Status: DISCONTINUED | OUTPATIENT
Start: 2019-08-02 | End: 2019-08-02 | Stop reason: HOSPADM

## 2019-08-02 RX ORDER — MIDAZOLAM HYDROCHLORIDE 1 MG/ML
INJECTION INTRAMUSCULAR; INTRAVENOUS
Status: COMPLETED
Start: 2019-08-02 | End: 2019-08-02

## 2019-08-02 RX ORDER — FENTANYL CITRATE 50 UG/ML
25 INJECTION, SOLUTION INTRAMUSCULAR; INTRAVENOUS EVERY 10 MIN PRN
Status: DISCONTINUED | OUTPATIENT
Start: 2019-08-02 | End: 2019-08-02 | Stop reason: HOSPADM

## 2019-08-02 RX ORDER — ONDANSETRON 2 MG/ML
4 INJECTION INTRAMUSCULAR; INTRAVENOUS ONCE AS NEEDED
Status: DISCONTINUED | OUTPATIENT
Start: 2019-08-02 | End: 2019-08-02 | Stop reason: HOSPADM

## 2019-08-02 RX ORDER — MIDAZOLAM HYDROCHLORIDE 1 MG/ML
4 INJECTION INTRAMUSCULAR; INTRAVENOUS ONCE
Status: COMPLETED | OUTPATIENT
Start: 2019-08-02 | End: 2019-08-02

## 2019-08-02 RX ORDER — ONDANSETRON 2 MG/ML
INJECTION INTRAMUSCULAR; INTRAVENOUS
Status: DISCONTINUED | OUTPATIENT
Start: 2019-08-02 | End: 2019-08-02

## 2019-08-02 RX ORDER — GLYCOPYRROLATE 0.2 MG/ML
INJECTION INTRAMUSCULAR; INTRAVENOUS
Status: DISCONTINUED | OUTPATIENT
Start: 2019-08-02 | End: 2019-08-02

## 2019-08-02 RX ORDER — LIDOCAINE HCL/PF 100 MG/5ML
SYRINGE (ML) INTRAVENOUS
Status: DISCONTINUED | OUTPATIENT
Start: 2019-08-02 | End: 2019-08-02

## 2019-08-02 RX ADMIN — DEXAMETHASONE SODIUM PHOSPHATE 4 MG: 4 INJECTION, SOLUTION INTRAMUSCULAR; INTRAVENOUS at 09:08

## 2019-08-02 RX ADMIN — LIDOCAINE HYDROCHLORIDE 70 MG: 20 INJECTION, SOLUTION INTRAVENOUS at 09:08

## 2019-08-02 RX ADMIN — MIDAZOLAM HYDROCHLORIDE 4 MG: 1 INJECTION, SOLUTION INTRAMUSCULAR; INTRAVENOUS at 08:08

## 2019-08-02 RX ADMIN — SODIUM CHLORIDE, SODIUM GLUCONATE, SODIUM ACETATE, POTASSIUM CHLORIDE, MAGNESIUM CHLORIDE, SODIUM PHOSPHATE, DIBASIC, AND POTASSIUM PHOSPHATE: .53; .5; .37; .037; .03; .012; .00082 INJECTION, SOLUTION INTRAVENOUS at 11:08

## 2019-08-02 RX ADMIN — Medication 1000 MG: at 08:08

## 2019-08-02 RX ADMIN — FENTANYL CITRATE 100 MCG: 50 INJECTION, SOLUTION INTRAMUSCULAR; INTRAVENOUS at 09:08

## 2019-08-02 RX ADMIN — ROPIVACAINE HYDROCHLORIDE: 2 INJECTION, SOLUTION EPIDURAL; INFILTRATION at 12:08

## 2019-08-02 RX ADMIN — GLYCOPYRROLATE 0.2 MG: 0.2 INJECTION, SOLUTION INTRAMUSCULAR; INTRAVENOUS at 09:08

## 2019-08-02 RX ADMIN — FENTANYL CITRATE 25 MCG: 50 INJECTION INTRAMUSCULAR; INTRAVENOUS at 12:08

## 2019-08-02 RX ADMIN — MIDAZOLAM HYDROCHLORIDE 4 MG: 1 INJECTION INTRAMUSCULAR; INTRAVENOUS at 08:08

## 2019-08-02 RX ADMIN — FENTANYL CITRATE 50 MCG: 50 INJECTION, SOLUTION INTRAMUSCULAR; INTRAVENOUS at 10:08

## 2019-08-02 RX ADMIN — FENTANYL CITRATE 50 MCG: 50 INJECTION, SOLUTION INTRAMUSCULAR; INTRAVENOUS at 11:08

## 2019-08-02 RX ADMIN — PROPOFOL 200 MG: 10 INJECTION, EMULSION INTRAVENOUS at 09:08

## 2019-08-02 RX ADMIN — SODIUM CHLORIDE: 0.9 INJECTION, SOLUTION INTRAVENOUS at 08:08

## 2019-08-02 RX ADMIN — ONDANSETRON 4 MG: 2 INJECTION INTRAMUSCULAR; INTRAVENOUS at 11:08

## 2019-08-02 NOTE — BRIEF OP NOTE
[facility]  Brief Operative Note     SUMMARY     Surgery Date: 8/2/2019     Surgeon(s) and Role:     * STEFFANIE Boyd MD - Primary    Assisting Surgeon: None    Pre-op Diagnosis:  Old tear of medial meniscus of right knee, unspecified tear type [M23.203]    Post-op Diagnosis:  Post-Op Diagnosis Codes:     * Old tear of medial meniscus of right knee, unspecified tear type [M23.203]    Procedure(s) (LRB):  ARTHROSCOPY, WITH MENISCUS REPAIR, (MEDIAL OR LATERAL) (Right), notchplasty    Anesthesia: General    Description of the findings of the procedure: see operative report/dictation    Findings/Key Components: see op report    Estimated Blood Loss: * No values recorded between 8/2/2019  9:22 AM and 8/2/2019 11:34 AM *         Specimens:   Specimen (12h ago, onward)    None          Discharge Note    SUMMARY     Admit Date: 8/2/2019    Discharge Date and Time:  08/02/2019 11:35 AM    Hospital Course (synopsis of major diagnoses, care, treatment, and services provided during the course of the hospital stay): uneventful outcome     Final Diagnosis: Post-Op Diagnosis Codes:     * Old tear of medial meniscus of right knee, unspecified tear type [M23.203]    Disposition: Home or Self Care    Follow Up/Patient Instructions:     Medications:  Reconciled Home Medications:      Medication List      CONTINUE taking these medications    aspirin 81 MG EC tablet  Commonly known as:  ECOTRIN  Take 1 tablet (81 mg total) by mouth 2 (two) times daily. for 14 days     ibuprofen 800 MG tablet  Commonly known as:  ADVIL,MOTRIN  Take 800 mg by mouth 3 (three) times daily.     ondansetron 4 MG tablet  Commonly known as:  ZOFRAN  Take 1 tablet (4 mg total) by mouth every 8 (eight) hours as needed.     oxyCODONE-acetaminophen 5-325 mg per tablet  Commonly known as:  PERCOCET  Take 1 tablet by mouth every 6 (six) hours as needed.          Discharge Procedure Orders   CRUTCHES FOR HOME USE     Order Specific Question  "Answer Comments   Type: Axillary    Height: 5' 9" (1.753 m)    Weight: 79.8 kg (176 lb)    Length of need (1-99 months): 2 2-3 months     Diet general     Call MD for:  temperature >100.4     Call MD for:  persistent nausea and vomiting     Call MD for:  severe uncontrolled pain     Call MD for:  difficulty breathing, headache or visual disturbances     Call MD for:  redness, tenderness, or signs of infection (pain, swelling, redness, odor or green/yellow discharge around incision site)     Call MD for:  hives     Call MD for:  persistent dizziness or light-headedness     Call MD for:  extreme fatigue     Remove dressing in 72 hours     Weight bearing restrictions (specify)   Order Comments: Toe touch weight bearing right lower extremity       "

## 2019-08-02 NOTE — INTERVAL H&P NOTE
The patient has been examined and the H&P has been reviewed:    I concur with the findings and no changes have occurred since H&P was written.     Patient denies any changes in health since his last clinic visit.    Anesthesia/Surgery risks, benefits and alternative options discussed and understood by patient/family.          Active Hospital Problems    Diagnosis  POA    Bucket-handle tear of medial meniscus of right knee as current injury [S83.211A]  Yes      Resolved Hospital Problems   No resolved problems to display.

## 2019-08-02 NOTE — ANESTHESIA POSTPROCEDURE EVALUATION
Anesthesia Post Evaluation    Patient: Jv Rankin    Procedure(s) Performed: Procedure(s) (LRB):  ARTHROSCOPY, WITH MENISCUS REPAIR, (MEDIAL OR LATERAL) (Right)    Final Anesthesia Type: general  Patient location during evaluation: PACU  Patient participation: Yes- Able to Participate  Level of consciousness: awake and alert and oriented  Post-procedure vital signs: reviewed and stable  Pain management: adequate  Airway patency: patent  PONV status at discharge: No PONV  Anesthetic complications: no      Cardiovascular status: blood pressure returned to baseline and hemodynamically stable  Respiratory status: unassisted, spontaneous ventilation and room air  Hydration status: euvolemic  Follow-up not needed.          Vitals Value Taken Time   /70 8/2/2019  1:45 PM   Temp 36.8 °C (98.2 °F) 8/2/2019 11:48 AM   Pulse 62 8/2/2019  1:45 PM   Resp 20 8/2/2019  1:45 PM   SpO2 100 % 8/2/2019  1:45 PM         No case tracking events are documented in the log.      Pain/Omr Score: Pain Rating Prior to Med Admin: 2 (8/2/2019 12:43 PM)  Pain Rating Post Med Admin: 2 (8/2/2019 12:43 PM)  Mor Score: 10 (8/2/2019 12:45 PM)

## 2019-08-02 NOTE — TRANSFER OF CARE
"Anesthesia Transfer of Care Note    Patient: Jv Rankin    Procedure(s) Performed: Procedure(s) (LRB):  ARTHROSCOPY, WITH MENISCUS REPAIR, (MEDIAL OR LATERAL) (Right)    Patient location: PACU    Anesthesia Type: general    Transport from OR: Transported from OR on 6-10 L/min O2 by face mask with adequate spontaneous ventilation    Post pain: adequate analgesia    Post assessment: tolerated procedure well and no apparent anesthetic complications    Post vital signs: stable    Level of consciousness: awake and alert    Nausea/Vomiting: no nausea/vomiting    Complications: none    Transfer of care protocol was followed      Last vitals:   Visit Vitals  BP (!) 107/57 (BP Location: Right arm, Patient Position: Lying)   Pulse 74   Temp 36.8 °C (98.2 °F) (Skin)   Resp 18   Ht 5' 9" (1.753 m)   Wt 79.8 kg (176 lb)   SpO2 100%   BMI 25.99 kg/m²     "

## 2019-08-02 NOTE — ANESTHESIA PROCEDURE NOTES
Adductor Canal    Patient location during procedure: pre-op   Block not for primary anesthetic.  Reason for block: at surgeon's request and post-op pain management   Post-op Pain Location: Right knee pain  Start time: 8/2/2019 8:41 AM  Timeout: 8/2/2019 8:40 AM   End time: 8/2/2019 9:00 AM    Staffing  Authorizing Provider: Rina Olmedo MD  Performing Provider: Josemanuel Martinez MD    Preanesthetic Checklist  Completed: patient identified, site marked, surgical consent, pre-op evaluation, timeout performed, IV checked, risks and benefits discussed and monitors and equipment checked  Peripheral Block  Patient position: supine  Prep: ChloraPrep and site prepped and draped  Patient monitoring: heart rate, cardiac monitor, continuous pulse ox, continuous capnometry and frequent blood pressure checks  Block type: adductor canal  Laterality: right  Injection technique: continuous  Needle  Needle type: Tuohy   Needle gauge: 17 G  Needle length: 3.5 in  Needle localization: anatomical landmarks and ultrasound guidance  Catheter type: spring wound  Catheter size: 19 G  Test dose: lidocaine 1.5% with Epi 1-to-200,000 and negative   -ultrasound image captured on disc.  Assessment  Injection assessment: negative aspiration, negative parasthesia and local visualized surrounding nerve  Paresthesia pain: none  Heart rate change: no  Slow fractionated injection: yes  Additional Notes  20cc Ropivacaine 0.25% with Epi 1:400,000, Clonidine 50 mcg, 1mg Decadron.  VSS.  [facility] RN monitoring vitals throughout procedure.  Patient tolerated procedure well.

## 2019-08-02 NOTE — ANESTHESIA PREPROCEDURE EVALUATION
2019  Jv Rankin is a 49 y.o., male.  Pre-operative evaluation for ARTHROSCOPY, WITH MENISCUS REPAIR, (MEDIAL OR LATERAL) (Right)    Chief Complaint: torn meniscus    PMH:  Past hx SVT- no meds    Past Surgical History:   Procedure Laterality Date    SHOULDER SURGERY Right          Vital Signs Range (Last 24H):  Temp:  [36.8 °C (98.2 °F)]   Pulse:  [53-63]   Resp:  [18]   BP: (108-111)/(69-72)   SpO2:  [100 %]       CBC:   No results for input(s): WBC, RBC, HGB, HCT, PLT, MCV, MCH, MCHC in the last 720 hours.    CMP: No results for input(s): NA, K, CL, CO2, BUN, CREATININE, GLU, MG, PHOS, CALCIUM, ALBUMIN, PROT, ALKPHOS, ALT, AST, BILITOT in the last 720 hours.    INR:  No results for input(s): PT, INR, PROTIME, APTT in the last 720 hours.      Diagnostic Studies:      [facility] Echo:    CONCLUSIONS     1 - Normal left ventricular systolic function (EF 60-65%).     2 - Normal right ventricular systolic function .     No evidence of stress induced myocardial ischemia.      This document has been electronically    SIGNED BY: Cain Lepe MD On: 2016 14:07  Anesthesia Evaluation    I have reviewed the Patient Summary Reports.     I have reviewed the Nursing Notes.   I have reviewed the Medications.     Review of Systems  Anesthesia Hx:  No problems with previous Anesthesia    Social:  Non-Smoker    Cardiovascular:  Cardiovascular Normal     Pulmonary:  Pulmonary Normal    Neurological:  Neurology Normal        Physical Exam  General:  Well nourished    Airway/Jaw/Neck:  Airway Findings: Mouth Opening: Normal Tongue: Normal  General Airway Assessment: Good  Mallampati: I  TM Distance: Normal, at least 6 cm  Jaw/Neck Findings:  Neck ROM: Normal ROM      Dental:  Dental Findings: In tact   Chest/Lungs:  Chest/Lungs Findings: Clear to auscultation, Normal Respiratory Rate     Heart/Vascular:  Heart  Findings: Rate: Normal  Rhythm: Regular Rhythm  Sounds: Normal        Mental Status:  Mental Status Findings:  Cooperative, Alert and Oriented         Anesthesia Plan  Type of Anesthesia, risks & benefits discussed:  Anesthesia Type:  general  Patient's Preference:   Intra-op Monitoring Plan: standard ASA monitors  Intra-op Monitoring Plan Comments:   Post Op Pain Control Plan:   Post Op Pain Control Plan Comments:   Induction:   IV  Beta Blocker:  Patient is not currently on a Beta-Blocker (No further documentation required).       Informed Consent: Patient understands risks and agrees with Anesthesia plan.  Questions answered. Anesthesia consent signed with patient.  ASA Score: 2     Day of Surgery Review of History & Physical:    H&P update referred to the surgeon.         Ready For Surgery From Anesthesia Perspective.

## 2019-08-02 NOTE — PLAN OF CARE
Called Into OR 2 informed  that patient does not have a signed consent in media tab. Patient can not have block until consent is signed.  stated that the consent is in his email.

## 2019-08-02 NOTE — ANESTHESIA PROCEDURE NOTES
IPACK Single Injection Block    Patient location during procedure: holding area   Block not for primary anesthetic.  Reason for block: at surgeon's request and post-op pain management   Post-op Pain Location: right knee pain  Start time: 8/2/2019 8:41 AM  Timeout: 8/2/2019 8:40 AM   End time: 8/2/2019 9:00 AM    Staffing  Authorizing Provider: Rina Olmedo MD  Performing Provider: Josemanuel Martinez MD    Preanesthetic Checklist  Completed: patient identified, site marked, surgical consent, pre-op evaluation, timeout performed, IV checked, risks and benefits discussed and monitors and equipment checked  Peripheral Block  Patient position: supine  Prep: ChloraPrep  Patient monitoring: heart rate, cardiac monitor, continuous pulse ox, continuous capnometry and frequent blood pressure checks  Block type: I PACK  Laterality: right  Injection technique: single shot  Needle  Needle type: Stimuplex   Needle gauge: 21 G  Needle length: 4 in  Needle localization: anatomical landmarks and ultrasound guidance   -ultrasound image captured on disc.  Assessment  Injection assessment: negative aspiration, negative parasthesia and local visualized surrounding nerve  Paresthesia pain: none  Heart rate change: no  Slow fractionated injection: yes  Additional Notes  20cc of 0.375 Bupivacaine with Epi 1:400,000, Clonidine 50mcg, Dexamethasone 1 mg.  VSS.  [facility] RN monitoring vitals throughout procedure.  Patient tolerated procedure well.

## 2019-08-02 NOTE — DISCHARGE INSTRUCTIONS
Discharge Instructions for Knee Arthroscopy  You had knee arthroscopy. This surgical procedure uses small incisions to locate, identify, and treat problems inside the knee. These problems include loose bodies, meniscal tears, bone spurs, osteochondritis dissecans (OCD), and synovitis. Below are tips to help speed your recovery from surgery.  Activity  · Dont drive until your doctor says its OK. And never drive while taking opioid pain medicine.  · Remember to take pain medicines as directed; dont wait for the pain to get bad. And don't drink alcohol while taking pain medicines.  · Follow weight-bearing instructions given by your doctor. He or she may require you to use crutches to keep weight off your knee.  · Unless your doctor tells you otherwise, begin using the affected knee as much as you can tolerate 3 days after surgery.  · Slowly bend and straighten your affected leg as far as you can, unless your doctor tells you otherwise. Do this several times a day.  · Rest your knee by lying down and putting pillows under it for the first 3 days after surgery. Keep your ankle elevated above the level of your heart. This helps keep swelling down.  · Follow your doctors instructions about wearing and caring for a brace, immobilizer, or elastic dressing.  · Point and flex your foot, and rotate your ankle as much as possible during the first few weeks following surgery. Also, wiggle your toes as much as possible.  Incision care  · Check your incision daily for redness, tenderness, or drainage.  · Dont be alarmed if there is some bruising, slight swelling of the knee, or a small amount of blood on the bandage.  · Adjust the bandage or brace as needed. It should feel supportive on your knee, but not too tight.   · Dont soak your incision in water (no hot tubs, bathtubs, swimming pools) until your doctor says its OK.  · Wait 2 day(s) after your surgery to begin showering. Then shower as needed. Cover your knee with  plastic to keep the dressing or brace dry. Once your dressing is removed, follow your doctors instructions for care of the wound. And sit on a shower stool so that you dont fall while showering.  · Use an ice pack or bag of frozen peas--or something similar--wrapped in a thin towel to reduce the swelling. Keep the foot elevated while you ice the knee. Apply the ice pack for 20 minutes; then remove it for 20 minutes. Repeat as needed. Icing helps reduce swelling.  Other precautions  · Arrange your household to keep the items you need within reach.  · Remove throw rugs, electrical cords, and anything else that may cause you to fall.  · Use nonslip bath mats, grab bars, an elevated toilet seat, and a shower chair in your bathroom.  · Use a cane, crutches, a walker, or handrails until your balance, flexibility, and strength improve, and you can put weight on your leg. And remember to ask for help from others when you need it.  · Free up your hands so that you can use them to keep balance. Use a denis pack, apron, or pockets to carry things.  Follow-up  Make a follow-up appointment as directed by your doctor.     When to seek medical attention  Call 911 right away if you have any of the following:  · Chest pain  · Shortness of breath  · Severe nausea  Otherwise, call your doctor immediately if you have any of the following:  · Pain that is not relieved by medicine or rest  · Continued bleeding through the bandage  · Tingling, numbness, or coldness in your foot or leg  · Fever above 100.4°F (38.0°C) or shaking chills  · Excessive swelling, increased redness, or any drainage around the incision  · Swelling, tenderness, or pain in your leg      Date Last Reviewed: 11/16/2015  © 4359-7395 [employer]. [address] [place] [address], [place] [address]. All rights reserved. This information is not intended as a substitute for professional medical care. Always follow your healthcare professional's  instructions.        Anesthesia: General Anesthesia     You are watched continuously during your procedure by your anesthesia provider.     Youre due to have surgery. During surgery, youll be given medicine called anesthesia or anesthetic. This will keep you comfortable and pain-free. Your anesthesia provider will use general anesthesia.  What is general anesthesia?  General anesthesia puts you into a state like deep sleep. It goes into the bloodstream (IV anesthetics), into the lungs (gas anesthetics), or both. You feel nothing during the procedure. You will not remember it. During the procedure, the anesthesia provider monitors you continuously. He or she checks your heart rate and rhythm, blood pressure, breathing, and blood oxygen.  · IV anesthetics. IV anesthetics are given through an IV line in your arm. Theyre often given first. This is so you are asleep before a gas anesthetic is started. Some kinds of IV anesthetics relieve pain. Others relax you. Your doctor will decide which kind is best in your case.  · Gas anesthetics. Gas anesthetics are breathed into the lungs. They are often used to keep you asleep. They can be given through a facemask or a tube placed in your larynx or trachea (breathing tube).  ¨ If you have a facemask, your anesthesia provider will most likely place it over your nose and mouth while youre still awake. Youll breathe oxygen through the mask as your IV anesthetic is started. Gas anesthetic may be added through the mask.  ¨ If you have a tube in the larynx or trachea, it will be inserted into your throat after youre asleep.  Anesthesia tools and medicines  You will likely have:  · IV anesthetics. These are put into an IV line into your bloodstream.  · Gas anesthetics. You breathe these anesthetics into your lungs, where they pass into your bloodstream.  · Pulse oximeter. This is a small clip that is attached to the end of your finger. This measures your blood oxygen  level.  · Electrocardiography leads (electrodes). These are small sticky pads that are placed on your chest. They record your heart rate and rhythm.  · Blood pressure cuff. This reads your blood pressure.  Risks and possible complications  General anesthesia has some risks. These include:  · Breathing problems  · Nausea and vomiting  · Sore throat or hoarseness (usually temporary)  · Allergic reaction to the anesthetic  · Irregular heartbeat (rare)  · Cardiac arrest (rare)   Anesthesia safety  · Follow all instructions you are given for how long not to eat or drink before your procedure.  · Be sure your doctor knows what medicines and drugs you take. This includes over-the-counter medicines, herbs, supplements, alcohol or other drugs. You will be asked when those were last taken.  · Have an adult family member or friend drive you home after the procedure.  · For the first 24 hours after your surgery:  ¨ Do not drive or use heavy equipment.  ¨ Do not make important decisions or sign legal documents. If important decisions or signing legal documents is necessary during the first 24 hours after surgery, have a trusted family member or spouse act on your behalf.  ¨ Avoid alcohol.  ¨ Have a responsible adult stay with you. He or she can watch for problems and help keep you safe.  Date Last Reviewed: 12/1/2016  © 5706-1763 [employer]. [address] [place] [address], [place] [address]. All rights reserved. This information is not intended as a substitute for professional medical care. Always follow your healthcare professional's instructions.

## 2019-08-04 ENCOUNTER — PATIENT MESSAGE (OUTPATIENT)
Dept: SPORTS MEDICINE | Facility: CLINIC | Age: 49
End: 2019-08-04

## 2019-08-04 NOTE — PROGRESS NOTES
Called patient and discussed removal of catheter today. Patient understands catheter to be removed today and to examine for blue tip intact at the end. Instructed to call with any issues. Questions and concerns answered.    Gi Walden MD  11:47 AM  08/04/2019

## 2019-08-04 NOTE — OP NOTE
?[facility]   OPERATIVE REPORT   ORTHOPAEDIC SURGERY   PROVIDER: DR. CATA NICKERSON    PATIENT INFORMATION   Jv Rankin 49 y.o. male 1970   MRN: [id number]   LOCATION: [facility]     DATE OF PROCEDURE: 8/2/2019     PREOPERATIVE DIAGNOSES:   1.  Right knee incarcerated bucket-handle medial meniscus tear    POSTOPERATIVE DIAGNOSES:   1.  Right knee incarcerated bucket-handle medial meniscus tear  2.  Right knee chondromalacia, minimal grade 2    PROCEDURES PERFORMED:   1.  Right knee arthroscopic combined all-inside and inside-out medial meniscus repair (CPT 34462, complex modifier-22)  2.  Right knee arthroscopic partial medial meniscectomy (CPT 84809)  3.  Right knee arthroscopic limited synovectomy with notchplasty (CPT 82852)  4.  Right knee arthroscopic chondroplasty (CPT 00681)    COMPLEX PROCEDURE:    There was an altered surgical field. There was abnormal anatomy.  This was a large bucket-handle type medial meniscus tear with complex involvement posteriorly. Given the size of the tear and the complexity, it was felt that a combined all-inside and inside-out repair approach would be of benefit in this case.  This required additional time, surgical dissection, and implants for repair. Complexity of the service was much greater than the normative meniscus repair procedure.  There was increased time, intensity and technical difficulty of the procedure, severity of the patient's condition and mental effort required.     Surgeon(s) and Role:     * STEFFANIE Nickerson MD - Primary     * Davy Goetz MD - Fellow     * SMA Angela    ANESTHESIA: General with adductor block    ESTIMATED BLOOD LOSS: 50 cc    IMPLANTS:   Implant Name Type Inv. Item Serial No.  Lot No. LRB No. Used   Hi-Fi Suture with double armed meniscal needles    [employer] [id number] Right 9      FINDINGS:  Displaced and incarcerated medial meniscus bucket-handle type tear, extending from the posterior  horn to the anterior body, with otherwise well-preserved tissue and intact root attachments. Small free edge radial tear component at the junction of the posterior body and posterior horn requiring debridement     SPECIMENS:   Specimen (12h ago, onward)    None        COMPLICATIONS: None.     INTRAOPERATIVE COUNTS: Correct.     PROPHYLACTIC IV ANTIBIOTICS: Given per [facility] Protocol.    INDICATIONS FOR PROCEDURE: Rogelio presented to clinic complaining of chronic medially based right knee pain with mechanical block in extension. MRI has shown a correlating flipped and incarcerated bucket-handle medial meniscus tear. He has been indicated for arthroscopic intervention to include repair versus partial meniscectomy.  The details of such were discussed at length prior to proceeding.  Full informed consent was obtained. The patient wished to proceed based upon intraoperative findings and what was felt to be best for his knee long-term.       DETAILS OF PROCEDURE: After the operative site was identified and marked, informed consent for the procedure was once again obtained today. The patient was then transferred to the OR and placed supine on the table. General anesthesia was administered. Examination under anesthesia of the right knee demonstrated full passive range of motion. Negative Lachman was shown.  Negative pivot shift.  Negative posterior drawer. Stable to varus and valgus stress. No rotational instability.  An arthroscopic leg webster was utilized and the right lower extremity was prepped and draped in the usual sterile fashion. All bony prominences were well padded. The contralateral lower extremity was supported in a stirrup.     Verbal timeout was confirmed to identify the correct operative site, patient identity and planned operative procedure.     The standard anterolateral and anteromedial scope portals were then made. The scope was introduced and diagnostic arthroscopy was performed. The patellofemoral  compartment was intact. There was limited synovial tissue fraying over the infrapatellar region. No significant chondromalacia.  The patella was found to track centrally within the groove.  There was no subluxation or lateral tilt. The above described medial meniscus tear was then seen and probed.  It was found to be easily reducible. This was an unstable tear. Tissue quality was assessed and found to be acceptable and amenable for repair. There was approximately 3 mm of remnant meniscus at the periphery over the midbody.  The tear was found to be predominantly at the red-white junction over this portion and extending to more of the red-red junction posteriorly.  The tear extended from the posterior horn adjacent to the intact root to the anterior body. There also was some free edge small radial tearing over the posterior horn and posterior body junction.  Based on these findings, it was felt that repair would be the most appropriate treatment in this case and in the patient's best interest.  No focal chondral defects were seen.  There were small areas of grade 2 chondromalacia of the medial femoral condyle but no significant wear otherwise.     The cruciate ligaments were seen in the intercondylar notch and probed.  They were found to be intact. There was some synovitis and hemorrhagic tissue over the lateral intercondylar notch, suggestive of an old partial injury to the femoral attachment of the ACL, but in the Cabot position the posterolateral bundle of the ACL was probed and found to be intact with appropriate tension. The ACL tensioned appropriately with an anterior drawer test under arthroscopic visualization.     Laterally, the articular cartilage and meniscus were probed and found to be intact.      Attention was turned back to the medial meniscus where the repair site was prepared with a ball rasp for local debridement.  This was carried out on both size of the tear from anterior to posterior. An accessory  medial portal was created to introduce a probe to assist in the repair. An arthroscopic shaver was introduced to debride and remove the small radial tear portion of the tear. Partial meniscectomy over this area was performed.  The majority of the posterior horn meniscus otherwise remained.  Two [employer] Nephew 360 degree all-inside suture fixators were placed for the repair of the posterior horn component. It was felt that an inside-out approach would be best for the remainder of the meniscus repair in this case.     The arthroscope was removed and the posterior medial knee incision was marked out for the inside-out repair. An Esmarch was used to exsanguinate the limb and the tourniquet was inflated to 300 mm of mercury.     With the tourniquet up, the posteromedial knee approach for the inside out medial meniscus repair was performed with the knee in 90 degrees of flexion. The incision was centered 1/3 above and 2/3 below the joint line measuring 3 cm in length. The interval was posterior to the MCL and anterior to semimembranosus following incising the sartorial fascia. The saphenous nerve was seen and protected.  A spoon was used anterior to the gastroc muscle for protection during needle passage. The Zubican zone specific cannula system was utilized for the inside-out portion of the repair to include 9 suture needles, 7 of which were passed in vertical mattress fashion over the femoral surface, 2 passed in horizontal mattress fashion over the tibial surface. Needle passage was performed with the knee in valgus stress in 30° of flexion. The inside out sutures were then tied to complete the repair, again with the knee in 20-30 degrees of flexion. Following repair, a probe was used to confirm excellent tissue reduction and fixation. Shaver was introduced once more to perform a limited debridement over the free edge frayed portion at the posterior horn and body junction.      Attention was then turned back towards  the intercondylar notch where a limited synovectomy was performed over the lateral wall.  The bur was introduced and a limited notchplasty was performed over this area as well to stimulate bleeding postoperatively and hopefully to improve healing potential.     The arthroscopic shaver was used to perform a limited synovectomy also over the anterior interval.  The remainder of the fat pad was preserved.     The tourniquet was then released.  No significant bleeders were encountered. Incisions were closed in standard fashion and sterile dressings applied. Patient placed in a t-scope brace set from 0-90 degrees and locked in full extension. Extubated and transferred to PACU in stable condition.     POSTOPERATIVE PLAN: The patient will be discharged home.  Start physical therapy next week.  Plan initially for toe-touch weight-bearing x 4-6 weeks, flexion 0-90 degrees. Wear brace for 6 weeks. Must be locked in extension when sleeping at night and also when on feet.  Patient given aspirin for DVT prophylaxis.

## 2019-08-04 NOTE — ADDENDUM NOTE
Addendum  created 08/04/19 1148 by Gi Walden MD    Intraprocedure Event edited, Sign clinical note

## 2019-08-05 ENCOUNTER — CLINICAL SUPPORT (OUTPATIENT)
Dept: REHABILITATION | Facility: HOSPITAL | Age: 49
End: 2019-08-05
Payer: MEDICAID

## 2019-08-05 ENCOUNTER — PATIENT MESSAGE (OUTPATIENT)
Dept: SPORTS MEDICINE | Facility: CLINIC | Age: 49
End: 2019-08-05

## 2019-08-05 DIAGNOSIS — M25.561 ACUTE PAIN OF RIGHT KNEE: ICD-10-CM

## 2019-08-05 DIAGNOSIS — S83.211D BUCKET-HANDLE TEAR OF MEDIAL MENISCUS OF RIGHT KNEE AS CURRENT INJURY, SUBSEQUENT ENCOUNTER: ICD-10-CM

## 2019-08-05 PROCEDURE — 97110 THERAPEUTIC EXERCISES: CPT

## 2019-08-05 PROCEDURE — 97161 PT EVAL LOW COMPLEX 20 MIN: CPT

## 2019-08-05 RX ORDER — OXYCODONE AND ACETAMINOPHEN 5; 325 MG/1; MG/1
1 TABLET ORAL EVERY 6 HOURS PRN
Qty: 28 TABLET | Refills: 0 | Status: SHIPPED | OUTPATIENT
Start: 2019-08-05

## 2019-08-05 NOTE — PLAN OF CARE
[facility]  Physical Therapy Initial Evaluation    Name: Jv Rankin  Clinic Number: [id number]    Therapy Diagnosis:   Encounter Diagnosis   Name Primary?    Acute pain of right knee      Physician: Roxanne Chin PA-C    Physician Orders: PT Eval and Treat   Medical Diagnosis from Referral: S83.211D (ICD-10-CM) - Bucket-handle tear of medial meniscus of right knee as current injury, subsequent encounter  Evaluation Date: 8/5/2019  Authorization Period Expiration: 7/31/2020  Plan of Care Expiration: 11/25/19  Visit # / Visits authorized: 1/ 1    Time In: 1215  Time Out: 1320  Total Billable Time: 65 minutes    Precautions: Standard, Plan initially for toe-touch weight-bearing x 4-6 weeks, flexion 0-90 degrees. Wear brace for 6 weeks. Must be locked in extension when sleeping at night and also when on feet.  Patient given aspirin for DVT prophylaxis.    PROCEDURES PERFORMED on 8/2/19:  1.  Right knee arthroscopic combined all-inside and inside-out medial meniscus repair (CPT 17785, complex modifier-22)  2.  Right knee arthroscopic partial medial meniscectomy (CPT 05191)  3.  Right knee arthroscopic limited synovectomy with notchplasty (CPT 36629)  4.  Right knee arthroscopic chondroplasty (CPT 88182)    Subjective   Date of onset: June 10th then sx on 8/2/19  History of current condition - Rogelio reports: that he injured himself on June 10th while in [place] rock climbing.  Had bucket handle meniscus tear that they were able to repair.  Pt underwent surgery on 8/2/19.  Pt states that he was trying different things to get knee back in shape prior to having surgery.  Had acupuncture done and other things prior to surgery.  Was on crutches since June 10th and knee was bent the entire time.       No past medical history on file.  Jv Rankin  has a past surgical history that includes Shoulder surgery (Right); Repair of meniscus of knee (Right, 8/2/2019); Knee arthroscopy w/ meniscectomy  (8/2/2019); Arthroscopic chondroplasty of knee joint (8/2/2019); and Synovectomy of knee (8/2/2019).    Jv has a current medication list which includes the following prescription(s): aspirin, ibuprofen, ondansetron, and oxycodone-acetaminophen.    Review of patient's allergies indicates:   Allergen Reactions    Pcn [penicillins]         Imaging, none taken since surgery    Prior Therapy: none  Social History: 2 story house but gf lives in 1 story;  lives alone  Occupation: Pt is a  and   Prior Level of Function: I with all ADLs and recreational/occupational activities  Current Level of Function: Unable to walk and bear weight through R LE    Pain:  Current 1/10, worst 8/10, best 1/10   Location: right knee   Description: Aching and Dull  Aggravating Factors: without nerve block  Easing Factors: pain medication, ice, rest and elevation    Pts goals: To return to being physically active in sports (bike racing, triathlons, rock climbing) /yoga.        Objective     Observation: Pt is healthy and in no acute distress.  Post-op dressing/brace in place - Removed bandages and covered incisions with bandaids.    Gait: Pt ambulates with B axillary crutches, NWB on the R with hinged knee brace locked at 0 degrees extension.     Functional tests:               SL Squat: Not performed 2/2 post-op.              DL Squat: Not performed 2/2 post-op.              Step-down: Not performed 2/2 post-op.              SLS EO: Not performed 2/2 post-op.              SLS EC: Not performed 2/2 post-op.     Range of Motion:   Knee Right Passive Left Passive   Flexion 67 152   Extension Lacking 4 8 hyper      Lower Extremity Strength:  Formal MMT not performed 2/2 POD#3 and increased pain.  Fair quad activation noted R LE.     Special Tests:  Not performed 2/2 post-op.     Joint Mobility: hypomobile patellar as expected post-op.     Palpation: no TTP     Sensation:  Intact; diminished 2/2 residual nerve block.    "  Flexibility:  Grossly hypomobile quad, hamstring and gastroc as expected on post-op R LE     Edema: mod as expected post-op          CMS Impairment/Limitation/Restriction for FOTO Knee Survey    Therapist reviewed FOTO scores for Jv Rankin on 8/5/2019.   FOTO documents entered into [employer] - see Media section.    Limitation Score: 75%         TREATMENT   Treatment Time In: 1245  Treatment Time Out: 1315  Total Treatment time separate from Evaluation: 30 minutes    Rogelio received therapeutic exercises to develop strength, endurance, ROM and flexibility for 20 minutes including:  Knee extension stretch (ankle propped) x 5 min  Quad sets 10 x 5"   PASSIVE Knee flexion off EOM 10 x 5"   HSS/HCS with strap x 30"     Rogelio received cold pack for 10 minutes to R knee.    Home Exercises and Patient Education Provided    Education provided re: POC, WB status, brace wear, goals for therapy, role of therapy for care, exercises/HEP    Written Home Exercises Provided: yes.  Exercises were reviewed and Rogelio was able to demonstrate them prior to the end of the session.   Pt received a written copy of exercises to perform at home. Rogelio demonstrated good  understanding of the education provided.     See EMR under media for exercises given.   Assessment   Jv is a 49 y.o. male referred to outpatient Physical Therapy s/p R meniscus repair on 8/2/19. Pt presents with decreased ROM, decreased LE strength, decreased quad activation, and impaired balance and gait.  Pt will benefit from physical therapy, specifically ROM, stretching, strengthening and eventually balance/gait training, in order to decrease his c/o pain, improve impairments and functional limitations.    Pt prognosis is Good.   Pt will benefit from skilled outpatient Physical Therapy to address the deficits stated above and in the chart below, provide pt/family education, and to maximize pt's level of independence.     Plan of care discussed with patient: Yes  Pt's " spiritual, cultural and educational needs considered and patient is agreeable to the plan of care and goals as stated below:     Anticipated Barriers for therapy: none    Medical Necessity is demonstrated by the following  History  Co-morbidities and personal factors that may impact the plan of care Co-morbidities:   none    Personal Factors:   no deficits     low   Examination  Body Structures and Functions, activity limitations and participation restrictions that may impact the plan of care Body Regions:   R LE    Body Systems:    gross symmetry  ROM  strength  gross coordinated movement  balance  gait  transfers  transitions  motor control  motor learning    Participation Restrictions:   Walking, rock climbing, swimming, biking    Activity limitations:   Learning and applying knowledge  no deficits    General Tasks and Commands  no deficits    Communication  no deficits    Mobility  lifting and carrying objects  walking  driving (bike, car, motorcycle)    Self care  dressing    Domestic Life  shopping  cooking  doing house work (cleaning house, washing dishes, laundry)    Interactions/Relationships  no deficits    Life Areas  no deficits    Community and Social Life  community life  recreation and leisure         high   Clinical Presentation stable and uncomplicated low   Decision Making/ Complexity Score: low     GOALS: Short Term Goals:  8 weeks  1.Report decreased R knee pain  < / =  3-4/10  to increase tolerance for walking, driving, ADLs.  2. Increase knee ROM to 8 hyperextension to 140 degrees flexion in order to be able to perform ADLs without difficulty.  3. Increase strength to 3+/5 MMT grade in R LE to increase tolerance for ADL and work activities.  4. Pt to tolerate HEP to improve ROM and independence with ADL's    Long Term Goals: 16 weeks  1.Report decreased R knee pain < / = 0-1/10  to increase tolerance for recreational activities  2.Patient goal: To return to being physically active in sports  (bike racing, triathlons, rock climbing) /yoga.    3.Increase strength to >/= 4+/5 in R LE  to increase tolerance for ADL and work activities.  4. Pt will be able to perform SL squat without compensation or c/o pain in order to return to recreational activities.    Plan   Plan of care Certification: 8/5/2019 to 11/25/19.    Outpatient Physical Therapy 2 times weekly for 16 weeks to include the following interventions: Electrical Stimulation Russian, Gait Training, Manual Therapy, Moist Heat/ Ice, Neuromuscular Re-ed, Patient Education, Therapeutic Activites and Therapeutic Exercise.     Taty Campos, PT, DPT, OCS

## 2019-08-06 ENCOUNTER — TELEPHONE (OUTPATIENT)
Dept: SPORTS MEDICINE | Facility: CLINIC | Age: 49
End: 2019-08-06

## 2019-08-06 DIAGNOSIS — Z98.890 S/P RIGHT KNEE ARTHROSCOPY: Primary | ICD-10-CM

## 2019-08-06 RX ORDER — OXYCODONE AND ACETAMINOPHEN 5; 325 MG/1; MG/1
1 TABLET ORAL
Qty: 28 TABLET | Refills: 0 | Status: SHIPPED | OUTPATIENT
Start: 2019-08-06

## 2019-08-06 NOTE — TELEPHONE ENCOUNTER
----- Message from Elaina Garcia sent at 8/6/2019  4:55 PM CDT -----  Contact: patient   Please call pt at [phone]    Patient stated that [facility] pharmacy needs a overide to release the medication     Patient is currently at the pharmacy     Refill request for oxyCODONE-acetaminophen (PERCOCET) 5-325 mg per tablet    Use [facility]Y #[id number] - [place], [place] - [address]    Thank you

## 2019-08-07 ENCOUNTER — PATIENT MESSAGE (OUTPATIENT)
Dept: SPORTS MEDICINE | Facility: CLINIC | Age: 49
End: 2019-08-07

## 2019-08-09 NOTE — ADDENDUM NOTE
Addendum  created 08/09/19 1239 by Josemanuel Martinez MD    Intraprocedure Blocks edited, Pend clinical note, Sign clinical note

## 2019-08-13 ENCOUNTER — TELEPHONE (OUTPATIENT)
Dept: ELECTROPHYSIOLOGY | Facility: CLINIC | Age: 49
End: 2019-08-13

## 2019-08-13 NOTE — TELEPHONE ENCOUNTER
----- Message from Gwen Holloway MA sent at 7/31/2019  4:58 PM CDT -----  Regarding: FW: referral      ----- Message -----  From: Kyleigh Galaviz  Sent: 7/31/2019   3:26 PM  To: [facility] Staff  Subject: referral                                         Referral to see Dr. Chavez last seen 6/7/2016. Thanks, Kyleigh

## 2019-08-13 NOTE — TELEPHONE ENCOUNTER
Called Mr. Rankin re scheduling Dr. Chavez's consult apt.  Mr. Rankin has [employer] (which is medicaid).  Adv Mr. Rankin I will have to speak with my supervisor re scheduling pts consult apt; pt asked if he can not see Dr. Chavez, if we can refer pt to another EP that does accept medicaid.  I told Mr. Rankin I fully understood & that I will get all info as possible for him & that I will call him back once I gather the info he needs.  Mr. Rankin verbalized understanding & thanked me for my help.

## 2019-08-15 ENCOUNTER — CLINICAL SUPPORT (OUTPATIENT)
Dept: REHABILITATION | Facility: HOSPITAL | Age: 49
End: 2019-08-15
Payer: MEDICAID

## 2019-08-15 ENCOUNTER — OFFICE VISIT (OUTPATIENT)
Dept: SPORTS MEDICINE | Facility: CLINIC | Age: 49
End: 2019-08-15
Payer: MEDICAID

## 2019-08-15 VITALS
HEIGHT: 69 IN | SYSTOLIC BLOOD PRESSURE: 117 MMHG | HEART RATE: 61 BPM | BODY MASS INDEX: 26.07 KG/M2 | DIASTOLIC BLOOD PRESSURE: 75 MMHG | WEIGHT: 176 LBS

## 2019-08-15 DIAGNOSIS — Z98.890 S/P RIGHT KNEE ARTHROSCOPY: Primary | ICD-10-CM

## 2019-08-15 DIAGNOSIS — M25.561 ACUTE PAIN OF RIGHT KNEE: ICD-10-CM

## 2019-08-15 PROCEDURE — 97140 MANUAL THERAPY 1/> REGIONS: CPT

## 2019-08-15 PROCEDURE — 99999 PR PBB SHADOW E&M-EST. PATIENT-LVL III: CPT | Mod: PBBFAC,,, | Performed by: PHYSICIAN ASSISTANT

## 2019-08-15 PROCEDURE — 99024 POSTOP FOLLOW-UP VISIT: CPT | Mod: ,,, | Performed by: PHYSICIAN ASSISTANT

## 2019-08-15 PROCEDURE — 99024 PR POST-OP FOLLOW-UP VISIT: ICD-10-PCS | Mod: ,,, | Performed by: PHYSICIAN ASSISTANT

## 2019-08-15 PROCEDURE — 99213 OFFICE O/P EST LOW 20 MIN: CPT | Mod: PBBFAC,PO | Performed by: PHYSICIAN ASSISTANT

## 2019-08-15 PROCEDURE — 97110 THERAPEUTIC EXERCISES: CPT

## 2019-08-15 PROCEDURE — 99999 PR PBB SHADOW E&M-EST. PATIENT-LVL III: ICD-10-PCS | Mod: PBBFAC,,, | Performed by: PHYSICIAN ASSISTANT

## 2019-08-15 NOTE — PROGRESS NOTES
S:Jv Rankin presents for post-operative evaluation.     DATE OF PROCEDURE: 8/2/2019   PROCEDURES PERFORMED:   1.  Right knee arthroscopic combined all-inside and inside-out medial meniscus repair (CPT 33993, complex modifier-22)  2.  Right knee arthroscopic partial medial meniscectomy (CPT 08174)  3.  Right knee arthroscopic limited synovectomy with notchplasty (CPT 52410)  4.  Right knee arthroscopic chondroplasty (CPT 36196)      Jv Rankin reports to be doing very well 2wk s/p the above mentioned procedure. Denies fevers, chills, night sweats, chest pain, difficulty breathing, calf pain or tenderness. Going to PT 2xWeek at the [facility] location. Seeing good progress daily. Pain is 1-2/10 at rest and 5/10 at its worst at night. He has only been taking pain medication as needed to sleep. He may need a refill and will let me know. He is currently TTWB with T-scope brace locked in extension and brace set to 90 degrees. Very pleased thus far.     O: The incisions are healing well.  No signs of infection.  Sutures were removed. Dermabond prineo removed. No significant pain or unusual tenderness.  PROM 0-75 without pain  Minimal effusion    A/P: Arthroscopic pictures were reviewed with the patient. Plan to follow the rehab plan as previously outlined. RTC in 4 weeks with Dr. Boyd. Maintain TTWB status. Continue to wean off pain medication. Adjusted patient's T-scope brace to ensure he was locked at 0 degrees. No flexion past 90 degrees for 6 weeks.

## 2019-08-15 NOTE — PROGRESS NOTES
"  Physical Therapy Daily Treatment Note     Name: Jv Rankin  [facility] Number: [id number]  Therapy Diagnosis:        Encounter Diagnosis   Name Primary?    Acute pain of right knee        Physician: Roxanne Chin PA-C     Physician Orders: PT Eval and Treat   Medical Diagnosis from Referral: S83.211D (ICD-10-CM) - Bucket-handle tear of medial meniscus of right knee as current injury, subsequent encounter  Evaluation Date: 8/5/2019  Authorization Period Expiration: 7/31/2020  Plan of Care Expiration: 11/25/19  Visit # / Visits authorized: 2/32     Time In: 0930  Time Out: 1030  Total Billable Time: 50 minutes  Tx time 60'     Precautions: Standard, Plan initially for toe-touch weight-bearing x 4-6 weeks, flexion 0-90 degrees. Wear brace for 6 weeks. Must be locked in extension when sleeping at night and also when on feet.  Patient given aspirin for DVT prophylaxis.     PROCEDURES PERFORMED on 8/2/19:  1.  Right knee arthroscopic combined all-inside and inside-out medial meniscus repair (CPT 58112, complex modifier-22)  2.  Right knee arthroscopic partial medial meniscectomy (CPT 21453)  3.  Right knee arthroscopic limited synovectomy with notchplasty (CPT 12768)  4.  Right knee arthroscopic chondroplasty (CPT 84009)         Subjective     Pt reports: min pain in R knee when arrived for tx. Increased pain at night   He was compliant with home exercise program.  Response to previous treatment: no problems  Functional change: gait with crutches and brace locked in extension     Pain: 1/10  Location: right knee      Objective     Rogelio received therapeutic exercises to develop strength, endurance, ROM, flexibility, posture and core stabilization for 35 minutes including:  Standing w/brace R hip abd 3x10  Standing w/brace R hip ext  3x10  Seated EOT 20x passive knee 45' and AA knee ext   R QS 3x10/3"  R SLR 3x10/3"  R SL hip abd 3x10/3"   Prone R hip ext 3x10/3"    Rogelio received the following manual therapy " techniques: Joint mobilizations and Soft tissue Mobilization were applied to the: R knee  for 15 minutes, including: patella mobs. GSS, and passive knee flexion     Rogelio received cold pack for 10 minutes to to decrease circulation, pain, and swelling.    Home Exercises Provided and Patient Education Provided     Education provided:   Posture awareness, TTWB, Passive R knee flexion  to 90',  and gait trg with brace locked     Written Home Exercises Provided: yes.  Exercises were reviewed and Rogelio was able to demonstrate them prior to the end of the session.  Rogelio demonstrated good  understanding of the education provided.     Assessment   Pt tolerating tx well. Pt with good understanding for Protocol restriction and WB status. Demonstrating good quad activation with min extensor lag noted. VC/TC for correcting form/technique with therex.   Rogelio is progressing well towards his goals.   Pt prognosis is Good.     Pt will continue to benefit from skilled outpatient physical therapy to address the deficits listed in the problem list box on initial evaluation, provide pt/family education and to maximize pt's level of independence in the home and community environment.     Pt's spiritual, cultural and educational needs considered and pt agreeable to plan of care and goals.    Anticipated barriers to physical therapy:     Goals: GOALS: Short Term Goals:  8 weeks  1.Report  decreased R knee pain  < / =  3-4/10  to increase tolerance for walking, driving, ADLs.(not today, progressing)  2. Increase knee ROM to 8 hyperextension to 140 degrees flexion in order to be able to perform ADLs(not today, progressing) without difficulty.  3. Increase strength to 3+/5 MMT grade in R LE to increase tolerance for ADL and work activities.(not today, progressing)  4. Pt to tolerate HEP to improve ROM and independence with ADL's(not today, progressing)     Long Term Goals: 16 weeks  1.Report decreased R knee pain < / = 0-1/10  to  increase tolerance for recreational activities(not today, progressing)  2.Patient goal: To return to being physically active in sports (bike racing, triathlons, rock climbing) /yoga.(not today, progressing)    3.Increase strength to >/= 4+/5 in R LE  to increase tolerance for ADL and work activities.(not today, progressing)  4. Pt will be able to perform SL squat without compensation or c/o pain in order to return to recreational activities      Plan     Continue with POC per protocol.    Josue Cole, PTA, STS

## 2019-08-19 ENCOUNTER — CLINICAL SUPPORT (OUTPATIENT)
Dept: REHABILITATION | Facility: HOSPITAL | Age: 49
End: 2019-08-19
Payer: MEDICAID

## 2019-08-19 DIAGNOSIS — M25.561 ACUTE PAIN OF RIGHT KNEE: ICD-10-CM

## 2019-08-19 PROCEDURE — 97110 THERAPEUTIC EXERCISES: CPT

## 2019-08-19 NOTE — PROGRESS NOTES
"  Physical Therapy Daily Treatment Note     Name: Jv Rankin  [facility] Number: [id number]  Therapy Diagnosis:        Encounter Diagnosis   Name Primary?    Acute pain of right knee        Physician: Roxanne Chin PA-C     Physician Orders: PT Eval and Treat   Medical Diagnosis from Referral: S83.211D (ICD-10-CM) - Bucket-handle tear of medial meniscus of right knee as current injury, subsequent encounter  Evaluation Date: 8/5/2019  Authorization Period Expiration: 12/15/2019  Plan of Care Expiration: 11/25/19  Visit # / Visits authorized: 2/32  Visit # total: 3     Time In: 1100  Time Out: 1205  Total Billable Time: 55 minutes  Tx time 65'     Precautions: Standard, Plan initially for toe-touch weight-bearing x 4-6 weeks, flexion 0-90 degrees. Wear brace for 6 weeks. Must be locked in extension when sleeping at night and also when on feet.  Patient given aspirin for DVT prophylaxis.     PROCEDURES PERFORMED on 8/2/19:  1.  Right knee arthroscopic combined all-inside and inside-out medial meniscus repair (CPT 36702, complex modifier-22)  2.  Right knee arthroscopic partial medial meniscectomy (CPT 55222)  3.  Right knee arthroscopic limited synovectomy with notchplasty (CPT 19777)  4.  Right knee arthroscopic chondroplasty (CPT 41910)         Subjective     Pt reports: that his R knee hurts more than L but less than last week.  He was compliant with home exercise program.  Response to previous treatment: no problems  Functional change: gait with crutches and brace locked in extension     Pain: 1/10  Location: right knee      Objective     Rogelio received therapeutic exercises to develop strength, endurance, ROM, flexibility, posture and core stabilization for 45 minutes including:  Knee extension stretch 3# thigh/3# shin x 5 min  Russian stim (10 sec on/ 10 sec off) w/ quad set x 10 min  SLR 3x10/3" B  R SL hip abd 3x10/3"   Prone R hip ext 3x10/3"  Seated EOT PASSIVE knee flexion up to 90 degrees 20 x 5"   Prone " hang 3# x 4 min    ---Not performed today-----  Standing w/brace R hip abd 3x10  Standing w/brace R hip ext  3x10      Rogelio received the following manual therapy techniques: Joint mobilizations and Soft tissue Mobilization were applied to the: R knee  for 10 minutes, including: patella mobs. GSS, and passive knee flexion       Rogelio received cold pack for 10 minutes to to decrease circulation, pain, and swelling.    Home Exercises Provided and Patient Education Provided     Education provided:   Posture awareness, TTWB, Passive R knee flexion  to 90',  and gait trg with brace locked     Written Home Exercises Provided: yes.  Exercises were reviewed and Rogelio was able to demonstrate them prior to the end of the session.  Rogelio demonstrated good  understanding of the education provided.     Assessment   Pt tolerated treatment well with addition of Russian stim today.  Pt with improved quad activation and strength.  Increased muscular guarding when perform AP joint mobs for knee extension.  Encouraged pt to relax and just let knee fall into full extension.  Pt able to reach 0 degrees with leg weighted while performing quad set.  Knee flexion is 70 degrees today.  Progress as tolerated.    Rogelio is progressing well towards his goals.   Pt prognosis is Good.     Pt will continue to benefit from skilled outpatient physical therapy to address the deficits listed in the problem list box on initial evaluation, provide pt/family education and to maximize pt's level of independence in the home and community environment.     Pt's spiritual, cultural and educational needs considered and pt agreeable to plan of care and goals.    Anticipated barriers to physical therapy:     Goals: GOALS: Short Term Goals:  8 weeks  1.Report  decreased R knee pain  < / =  3-4/10  to increase tolerance for walking, driving, ADLs.(not today, progressing)  2. Increase knee ROM to 8 hyperextension to 140 degrees flexion in order to be able to  perform ADLs(not today, progressing) without difficulty.  3. Increase strength to 3+/5 MMT grade in R LE to increase tolerance for ADL and work activities.(not today, progressing)  4. Pt to tolerate HEP to improve ROM and independence with ADL's(not today, progressing)     Long Term Goals: 16 weeks  1.Report decreased R knee pain < / = 0-1/10  to increase tolerance for recreational activities(not today, progressing)  2.Patient goal: To return to being physically active in sports (bike racing, triathlons, rock climbing) /yoga.(not today, progressing)    3.Increase strength to >/= 4+/5 in R LE  to increase tolerance for ADL and work activities.(not today, progressing)  4. Pt will be able to perform SL squat without compensation or c/o pain in order to return to recreational activities      Plan     Continue with POC per protocol, focusing on ROM and quad activation.    Taty Campos, PT, DPT, OCS

## 2019-08-21 ENCOUNTER — PATIENT MESSAGE (OUTPATIENT)
Dept: SPORTS MEDICINE | Facility: CLINIC | Age: 49
End: 2019-08-21

## 2019-08-29 ENCOUNTER — PATIENT MESSAGE (OUTPATIENT)
Dept: SPORTS MEDICINE | Facility: CLINIC | Age: 49
End: 2019-08-29

## 2019-09-04 ENCOUNTER — CLINICAL SUPPORT (OUTPATIENT)
Dept: REHABILITATION | Facility: HOSPITAL | Age: 49
End: 2019-09-04
Payer: MEDICAID

## 2019-09-04 DIAGNOSIS — M25.561 ACUTE PAIN OF RIGHT KNEE: ICD-10-CM

## 2019-09-04 PROCEDURE — 97110 THERAPEUTIC EXERCISES: CPT

## 2019-09-04 PROCEDURE — 97014 ELECTRIC STIMULATION THERAPY: CPT

## 2019-09-04 NOTE — PROGRESS NOTES
"  Physical Therapy Daily Treatment Note     Name: Jv Rankin  [facility] Number: [id number]  Therapy Diagnosis:        Encounter Diagnosis   Name Primary?    Acute pain of right knee        Physician: Roxanne Chin PA-C     Physician Orders: PT Eval and Treat   Medical Diagnosis from Referral: S83.211D (ICD-10-CM) - Bucket-handle tear of medial meniscus of right knee as current injury, subsequent encounter  Evaluation Date: 8/5/2019  Authorization Period Expiration: 12/15/2019  Plan of Care Expiration: 11/25/19  Visit # / Visits authorized: 3/32  Visit # total: 4     Time In: 1300  Time Out: 1400  Total Billable Time: 35 minutes  Tx time 65'     Precautions: Standard, Plan initially for toe-touch weight-bearing x 4-6 weeks, flexion 0-90 degrees. Wear brace for 6 weeks. Must be locked in extension when sleeping at night and also when on feet.  Patient given aspirin for DVT prophylaxis.     PROCEDURES PERFORMED on 8/2/19:  1.  Right knee arthroscopic combined all-inside and inside-out medial meniscus repair (CPT 90222, complex modifier-22)  2.  Right knee arthroscopic partial medial meniscectomy (CPT 32210)  3.  Right knee arthroscopic limited synovectomy with notchplasty (CPT 60471)  4.  Right knee arthroscopic chondroplasty (CPT 72089)         Subjective     Pt reports: that his R knee doesn't feel the same as L knee but he has no pain today.  He was compliant with home exercise program.  Response to previous treatment: no problems  Functional change: gait with crutches and brace locked in extension     Pain: 0/10  Location: right knee      Objective     Rogelio received therapeutic exercises to develop strength, endurance, ROM, flexibility, posture and core stabilization for 45 minutes including:  Russian stim (10 sec on/ 10 sec off) w/ quad set (ankle propped) x 10 min  SLR 2# 3x10/3" B  R SL hip abd 2# 3x10/3"   Prone R hip ext 2# 3x10/3"  R SL hip add 2# 3x10/3"   Seated EOT PASSIVE knee flexion up to 90 degrees " "30 x 5"   Prone hang 3# x 3 min  HSS/HCS w/ strap 3 x 30"   PF w/ BTB x 30     ---Not performed today-----  Standing w/brace R hip abd 3x10  Standing w/brace R hip ext  3x10      Rogelio received the following manual therapy techniques: Joint mobilizations and Soft tissue Mobilization were applied to the: R knee  for 5 minutes, including: patella mobs. GSS, and passive knee flexion       Rogelio received cold pack for 10 minutes to to decrease circulation, pain, and swelling.    Home Exercises Provided and Patient Education Provided     Education provided:   Posture awareness, TTWB, Passive R knee flexion  to 90',  and gait trg with brace locked     Written Home Exercises Provided: yes.  Exercises were reviewed and Rogelio was able to demonstrate them prior to the end of the session.  Rogelio demonstrated good  understanding of the education provided.     Assessment   Pt tolerated treatment well today.  Increased resistance with SLRs and pt was still able to perform without extensor lag.  Pt with full knee extension today and 90 degrees of knee flexion.  Encouraged pt to pump ankle and perform some exercises prior to getting up in the morning and that should help prevent the rush of blood flow to foot early in the morning after a long night of sleep.  Pt with good understanding.  Progress as tolerated.  Next Friday will make 6 weeks post op.    Rogelio is progressing well towards his goals.   Pt prognosis is Good.     Pt will continue to benefit from skilled outpatient physical therapy to address the deficits listed in the problem list box on initial evaluation, provide pt/family education and to maximize pt's level of independence in the home and community environment.     Pt's spiritual, cultural and educational needs considered and pt agreeable to plan of care and goals.    Anticipated barriers to physical therapy:     Goals: GOALS: Short Term Goals:  8 weeks  1.Report  decreased R knee pain  < / =  3-4/10  to " increase tolerance for walking, driving, ADLs.(not today, progressing)  2. Increase knee ROM to 8 hyperextension to 140 degrees flexion in order to be able to perform ADLs(not today, progressing) without difficulty.  3. Increase strength to 3+/5 MMT grade in R LE to increase tolerance for ADL and work activities.(not today, progressing)  4. Pt to tolerate HEP to improve ROM and independence with ADL's(not today, progressing)     Long Term Goals: 16 weeks  1.Report decreased R knee pain < / = 0-1/10  to increase tolerance for recreational activities(not today, progressing)  2.Patient goal: To return to being physically active in sports (bike racing, triathlons, rock climbing) /yoga.(not today, progressing)    3.Increase strength to >/= 4+/5 in R LE  to increase tolerance for ADL and work activities.(not today, progressing)  4. Pt will be able to perform SL squat without compensation or c/o pain in order to return to recreational activities      Plan     Continue with POC per protocol, focusing on ROM and quad activation.    Taty Campos, PT, DPT, OCS

## 2019-09-11 NOTE — PROGRESS NOTES
S:Jv Rankin presents for post-operative evaluation.     DATE OF PROCEDURE: 8/2/2019   PROCEDURES PERFORMED:   1.  Right knee arthroscopic combined all-inside and inside-out medial meniscus repair  2.  Right knee arthroscopic partial medial meniscectomy  3.  Right knee arthroscopic limited synovectomy with notchplasty  4.  Right knee arthroscopic chondroplasty    Jv Rankin reports to be doing very well 6 wk s/p the above mentioned procedure. He is currently TTWB with T-scope brace locked in extension. Going to PT 2xWeek at the [facility] location. Seeing good progress daily. No sig pain complaints. Pleased thus far.     O: RLE - The incisions are well healed. No signs of infection. No significant pain or unusual tenderness. No swelling or effusion. Full active extension. Passive flexion to 95 with some stiffness. Mildly decreased patellar mobility. Minimal medial joint line tenderness. NVI.  Intact saphenous nerve function    A/P: Arthroscopic pictures were reviewed with the patient. Plan to follow the rehab plan as previously outlined. Dicussed the importance of regaining terminal flexion at this time, progress past 90 degrees now. Instructed him on how to do patellar mobilizations at home.  No deep squats or high impact activity until cleared by me.  RTC in 6 weeks. All questions answered.

## 2019-09-12 ENCOUNTER — OFFICE VISIT (OUTPATIENT)
Dept: SPORTS MEDICINE | Facility: CLINIC | Age: 49
End: 2019-09-12
Payer: MEDICAID

## 2019-09-12 ENCOUNTER — CLINICAL SUPPORT (OUTPATIENT)
Dept: REHABILITATION | Facility: HOSPITAL | Age: 49
End: 2019-09-12
Payer: MEDICAID

## 2019-09-12 VITALS
SYSTOLIC BLOOD PRESSURE: 132 MMHG | DIASTOLIC BLOOD PRESSURE: 81 MMHG | HEIGHT: 69 IN | HEART RATE: 59 BPM | WEIGHT: 176 LBS | BODY MASS INDEX: 26.07 KG/M2

## 2019-09-12 DIAGNOSIS — Z47.89 SURGICAL AFTERCARE, MUSCULOSKELETAL SYSTEM: ICD-10-CM

## 2019-09-12 DIAGNOSIS — Z98.890 S/P RIGHT KNEE ARTHROSCOPY: Primary | ICD-10-CM

## 2019-09-12 DIAGNOSIS — M25.561 ACUTE PAIN OF RIGHT KNEE: ICD-10-CM

## 2019-09-12 PROCEDURE — 99999 PR PBB SHADOW E&M-EST. PATIENT-LVL III: ICD-10-PCS | Mod: PBBFAC,,, | Performed by: ORTHOPAEDIC SURGERY

## 2019-09-12 PROCEDURE — 99999 PR PBB SHADOW E&M-EST. PATIENT-LVL III: CPT | Mod: PBBFAC,,, | Performed by: ORTHOPAEDIC SURGERY

## 2019-09-12 PROCEDURE — 97110 THERAPEUTIC EXERCISES: CPT

## 2019-09-12 PROCEDURE — 99024 PR POST-OP FOLLOW-UP VISIT: ICD-10-PCS | Mod: ,,, | Performed by: ORTHOPAEDIC SURGERY

## 2019-09-12 PROCEDURE — 99213 OFFICE O/P EST LOW 20 MIN: CPT | Mod: PBBFAC,PO | Performed by: ORTHOPAEDIC SURGERY

## 2019-09-12 PROCEDURE — 99024 POSTOP FOLLOW-UP VISIT: CPT | Mod: ,,, | Performed by: ORTHOPAEDIC SURGERY

## 2019-09-12 NOTE — PROGRESS NOTES
"  Physical Therapy Daily Treatment Note     Name: Jv Rankin  [facility] Number: [id number]  Therapy Diagnosis:        Encounter Diagnosis   Name Primary?    Acute pain of right knee        Physician: Roxanne Chin PA-C     Physician Orders: PT Eval and Treat   Medical Diagnosis from Referral: S83.211D (ICD-10-CM) - Bucket-handle tear of medial meniscus of right knee as current injury, subsequent encounter  Evaluation Date: 8/5/2019  Authorization Period Expiration: 12/15/2019  Plan of Care Expiration: 11/25/19  Visit # / Visits authorized: 4/32  Visit # total: 5     Time In: 0910  Time Out: 1000  Total Billable Time: 50 minutes  Tx time 50'     Precautions: Standard, Plan initially for toe-touch weight-bearing x 4-6 weeks, flexion 0-90 degrees. Wear brace for 6 weeks. Must be locked in extension when sleeping at night and also when on feet.  Patient given aspirin for DVT prophylaxis.     PROCEDURES PERFORMED on 8/2/19:  1.  Right knee arthroscopic combined all-inside and inside-out medial meniscus repair (CPT 17385, complex modifier-22)  2.  Right knee arthroscopic partial medial meniscectomy (CPT 37849)  3.  Right knee arthroscopic limited synovectomy with notchplasty (CPT 36932)  4.  Right knee arthroscopic chondroplasty (CPT 25348)         Subjective     Pt reports: that no c/o pain.  He was compliant with home exercise program.  Response to previous treatment: no problems  Functional change: gait with crutches and brace locked in extension     Pain: 0/10  Location: right knee      Objective     Knee flexion: 95 degrees  Knee extension: 0 degrees    Rogelio received therapeutic exercises to develop strength, endurance, ROM, flexibility, posture and core stabilization for 45 minutes including:  Russian stim (10 sec on/ 10 sec off) w/ quad set (ankle propped) x 10 min  SLR 2# 3x10/3" B  R SL hip abd 2# 3x10/3"   Prone R hip ext 2# 3x10/3"  R SL hip add 2# 3x10/3"   Seated EOT PASSIVE knee flexion up to 90 degrees 20 " "x 5"   HSS/HCS w/ strap 3 x 30"   PF w/ BTB x 45  Weight shifting 2 min x 10"   TKE w/ BTB 30 x 5"       ---Not performed today-----  Standing w/brace R hip abd 3x10  Standing w/brace R hip ext  3x10      Rogelio received the following manual therapy techniques: Joint mobilizations and Soft tissue Mobilization were applied to the: R knee  for 5 minutes, including: patella mobs. GSS, and passive knee flexion       Rogelio received cold pack for 10 minutes to to decrease circulation, pain, and swelling.    Home Exercises Provided and Patient Education Provided     Education provided:   Posture awareness, TTWB, Passive R knee flexion  to 90',  and gait trg with brace locked     Written Home Exercises Provided: yes.  Exercises were reviewed and Rogelio was able to demonstrate them prior to the end of the session.  Rogelio demonstrated good  understanding of the education provided.     Assessment   Pt tolerated treatment well today.  Pt presents with good knee ROM for this phase of rehab.  He will be 6 weeks post op tomorrow, therefore initiated TKEs and weight shifting today to further improve functional mobility.  Pt able to apply 50% weight bearing through R LE today without c/o pain.  Will continue to progress per protocol.    Rogelio is progressing well towards his goals.   Pt prognosis is Good.     Pt will continue to benefit from skilled outpatient physical therapy to address the deficits listed in the problem list box on initial evaluation, provide pt/family education and to maximize pt's level of independence in the home and community environment.     Pt's spiritual, cultural and educational needs considered and pt agreeable to plan of care and goals.    Anticipated barriers to physical therapy:     Goals: GOALS: Short Term Goals:  8 weeks  1.Report  decreased R knee pain  < / =  3-4/10  to increase tolerance for walking, driving, ADLs.(not today, progressing)  2. Increase knee ROM to 8 hyperextension to 140 degrees " flexion in order to be able to perform ADLs(not today, progressing) without difficulty.  3. Increase strength to 3+/5 MMT grade in R LE to increase tolerance for ADL and work activities.(not today, progressing)  4. Pt to tolerate HEP to improve ROM and independence with ADL's(not today, progressing)     Long Term Goals: 16 weeks  1.Report decreased R knee pain < / = 0-1/10  to increase tolerance for recreational activities(not today, progressing)  2.Patient goal: To return to being physically active in sports (bike racing, triathlons, rock climbing) /yoga.(not today, progressing)    3.Increase strength to >/= 4+/5 in R LE  to increase tolerance for ADL and work activities.(not today, progressing)  4. Pt will be able to perform SL squat without compensation or c/o pain in order to return to recreational activities      Plan     Continue with POC per protocol, focusing on ROM and quad activation.    Taty Campos, PT, DPT, OCS

## 2019-09-16 ENCOUNTER — CLINICAL SUPPORT (OUTPATIENT)
Dept: REHABILITATION | Facility: HOSPITAL | Age: 49
End: 2019-09-16
Payer: MEDICAID

## 2019-09-16 DIAGNOSIS — M25.561 ACUTE PAIN OF RIGHT KNEE: ICD-10-CM

## 2019-09-16 PROCEDURE — 97110 THERAPEUTIC EXERCISES: CPT

## 2019-09-16 NOTE — PROGRESS NOTES
"  Physical Therapy Daily Treatment Note     Name: Jv Rankin  [facility] Number: [id number]  Therapy Diagnosis:        Encounter Diagnosis   Name Primary?    Acute pain of right knee        Physician: Roxanne Chin PA-C     Physician Orders: PT Eval and Treat   Medical Diagnosis from Referral: S83.211D (ICD-10-CM) - Bucket-handle tear of medial meniscus of right knee as current injury, subsequent encounter  Evaluation Date: 8/5/2019  Authorization Period Expiration: 12/15/2019  Plan of Care Expiration: 11/25/19  Visit # / Visits authorized: 5/32  Visit # total: 6     Time In: 1100  Time Out: 1200  Total Billable Time: 40 minutes  Tx time 60'     Precautions: Standard, Plan initially for toe-touch weight-bearing x 4-6 weeks, flexion 0-90 degrees. Wear brace for 6 weeks. Must be locked in extension when sleeping at night and also when on feet.  Patient given aspirin for DVT prophylaxis.     PROCEDURES PERFORMED on 8/2/19:  1.  Right knee arthroscopic combined all-inside and inside-out medial meniscus repair (CPT 34700, complex modifier-22)  2.  Right knee arthroscopic partial medial meniscectomy (CPT 31011)  3.  Right knee arthroscopic limited synovectomy with notchplasty (CPT 07386)  4.  Right knee arthroscopic chondroplasty (CPT 83939)         Subjective     Pt reports: that no c/o pain.  States that he overdid it this weekend and had increased swelling.  Had to elevate and ice but swelling has since gone down.    He was compliant with home exercise program.  Response to previous treatment: no problems  Functional change: gait with crutches and brace unlocked    Pain: 0/10  Location: right knee      Objective     Knee flexion: 100 degrees  Knee extension: 0 degrees    Rogelio received therapeutic exercises to develop strength, endurance, ROM, flexibility, posture and core stabilization for 60 minutes including:  Quad set 30 x 5"   Seated EOT PASSIVE knee flexion 20 x 5"   Bike x 10 min  Weight shifting side to side " "and fwd/bkwd 2 min each x 10"   TKE w/ BTB 30 x 5"   Gait training with small hurdles x multiple trials  Calf raises x 30  Calf stretch on slant x 2 min  SLR 2# 3x10/3" B  R SL hip abd 2# 3x10/3"   Prone R hip ext 2# 3x10/3"  R SL hip add 2# 3x10/3"   HSS/HCS w/ strap 3 x 30"         Rogelio received the following manual therapy techniques: Joint mobilizations and Soft tissue Mobilization were applied to the: R knee  for 0 minutes, including: patella mobs. GSS, and passive knee flexion       Rogelio received cold pack for 10 minutes to to decrease circulation, pain, and swelling.    Home Exercises Provided and Patient Education Provided     Education provided:   Posture awareness, WBAT with brace unlocked    Written Home Exercises Provided: yes.  Exercises were reviewed and Rogelio was able to demonstrate them prior to the end of the session.  Rogelio demonstrated good  understanding of the education provided.     Assessment   Pt tolerated treatment well today with progression of exercises.  Pt able to bear about 75% weight through R LE.  Worked on gait with one crutch today to further improve functional mobility.  Pt with good heel to toe gait and good quad activation at heel strike.  Instructed pt to walk with brace unlocked with one crutch for now.  Pt is with good understanding.  Progress as tolerated.    Rogelio is progressing well towards his goals.   Pt prognosis is Good.     Pt will continue to benefit from skilled outpatient physical therapy to address the deficits listed in the problem list box on initial evaluation, provide pt/family education and to maximize pt's level of independence in the home and community environment.     Pt's spiritual, cultural and educational needs considered and pt agreeable to plan of care and goals.    Anticipated barriers to physical therapy:     Goals: GOALS: Short Term Goals:  8 weeks  1.Report  decreased R knee pain  < / =  3-4/10  to increase tolerance for walking, driving, " ADLs.(not today, progressing)  2. Increase knee ROM to 8 hyperextension to 140 degrees flexion in order to be able to perform ADLs(not today, progressing) without difficulty.  3. Increase strength to 3+/5 MMT grade in R LE to increase tolerance for ADL and work activities.(not today, progressing)  4. Pt to tolerate HEP to improve ROM and independence with ADL's(not today, progressing)     Long Term Goals: 16 weeks  1.Report decreased R knee pain < / = 0-1/10  to increase tolerance for recreational activities(not today, progressing)  2.Patient goal: To return to being physically active in sports (bike racing, triathlons, rock climbing) /yoga.(not today, progressing)    3.Increase strength to >/= 4+/5 in R LE  to increase tolerance for ADL and work activities.(not today, progressing)  4. Pt will be able to perform SL squat without compensation or c/o pain in order to return to recreational activities      Plan     Continue with POC per protocol, focusing on ROM and quad activation.    Taty Campos, PT, DPT, OCS

## 2019-09-24 ENCOUNTER — CLINICAL SUPPORT (OUTPATIENT)
Dept: REHABILITATION | Facility: HOSPITAL | Age: 49
End: 2019-09-24
Payer: MEDICAID

## 2019-09-24 DIAGNOSIS — M25.561 ACUTE PAIN OF RIGHT KNEE: ICD-10-CM

## 2019-09-24 PROCEDURE — 97110 THERAPEUTIC EXERCISES: CPT

## 2019-09-24 NOTE — PROGRESS NOTES
"  Physical Therapy Daily Treatment Note     Name: Jv Rankin  [facility] Number: [id number]  Therapy Diagnosis:        Encounter Diagnosis   Name Primary?    Acute pain of right knee        Physician: Roxanne Chin PA-C     Physician Orders: PT Eval and Treat   Medical Diagnosis from Referral: S83.211D (ICD-10-CM) - Bucket-handle tear of medial meniscus of right knee as current injury, subsequent encounter  Evaluation Date: 8/5/2019  Authorization Period Expiration: 12/15/2019  Plan of Care Expiration: 11/25/19  Visit # / Visits authorized: 6/32  Visit # total: 7     Time In: 1445  Time Out: 1545  Total Billable Time: 60 minutes  Tx time 60'     Precautions: Standard, Plan initially for toe-touch weight-bearing x 4-6 weeks, flexion 0-90 degrees. Wear brace for 6 weeks. Must be locked in extension when sleeping at night and also when on feet.  Patient given aspirin for DVT prophylaxis.     PROCEDURES PERFORMED on 8/2/19:  1.  Right knee arthroscopic combined all-inside and inside-out medial meniscus repair (CPT 90446, complex modifier-22)  2.  Right knee arthroscopic partial medial meniscectomy (CPT 31034)  3.  Right knee arthroscopic limited synovectomy with notchplasty (CPT 82288)  4.  Right knee arthroscopic chondroplasty (CPT 86918)         Subjective     Pt reports: min discomfort and pain in and around R knee.    He was compliant with home exercise program.  Response to previous treatment: no problems  Functional change: gait with crutches and brace unlocked    Pain: 0/10  Location: right knee      Objective     Knee flexion: 100 degrees  Knee extension: 2 corazon Mercado received therapeutic exercises to develop strength, endurance, ROM, flexibility, posture and core stabilization for 50 minutes including:  Knee extension stretch 4# thigh/4# shin x 5 min  Quad set 30 x 5"   Passive heel slides 20 x 5"   Bike x 10 min  SLS 3 x 30"   TKE w/ BTB 30 x 5"   Gait training with small hurdles x multiple trials  Calf " "raises x 30  Calf stretch on slant x 2 min  SL clams 3 x 30" B  HSS/HCS w/ strap 3 x 30"   Mini squats - NEXT    ----not performed today---  SLR 2# 3x10/3" B  R SL hip abd 2# 3x10/3"   Prone R hip ext 2# 3x10/3"  R SL hip add 2# 3x10/3"       Rogelio received the following manual therapy techniques: Joint mobilizations and Soft tissue Mobilization were applied to the: R knee  for 10 minutes, including: patella mobs. GSS, and passive knee flexion       Rogelio received cold pack for 10 minutes to to decrease circulation, pain, and swelling.    Home Exercises Provided and Patient Education Provided     Education provided:   Posture awareness, WBAT with brace unlocked    Written Home Exercises Provided: yes.  Exercises were reviewed and Rogelio was able to demonstrate them prior to the end of the session.  Rogelio demonstrated good  understanding of the education provided.     Assessment   Pt tolerated treatment well today with progression of exercises.  Pt is now able to bear full weight through R LE, without use of brace.  Pt initially came in today lacking full knee extension.  After joint mobs and self stretching, full extension was achieved. Worked on gait and proper heel to toe gait without AD or brace today.  Continue to progress as tolerated.    Rogelio is progressing well towards his goals.   Pt prognosis is Good.     Pt will continue to benefit from skilled outpatient physical therapy to address the deficits listed in the problem list box on initial evaluation, provide pt/family education and to maximize pt's level of independence in the home and community environment.     Pt's spiritual, cultural and educational needs considered and pt agreeable to plan of care and goals.    Anticipated barriers to physical therapy:     Goals: GOALS: Short Term Goals:  8 weeks  1.Report  decreased R knee pain  < / =  3-4/10  to increase tolerance for walking, driving, ADLs.(not today, progressing)  2. Increase knee ROM to 8 " hyperextension to 140 degrees flexion in order to be able to perform ADLs(not today, progressing) without difficulty.  3. Increase strength to 3+/5 MMT grade in R LE to increase tolerance for ADL and work activities.(not today, progressing)  4. Pt to tolerate HEP to improve ROM and independence with ADL's(not today, progressing)     Long Term Goals: 16 weeks  1.Report decreased R knee pain < / = 0-1/10  to increase tolerance for recreational activities(not today, progressing)  2.Patient goal: To return to being physically active in sports (bike racing, triathlons, rock climbing) /yoga.(not today, progressing)    3.Increase strength to >/= 4+/5 in R LE  to increase tolerance for ADL and work activities.(not today, progressing)  4. Pt will be able to perform SL squat without compensation or c/o pain in order to return to recreational activities      Plan     Continue with POC per protocol, focusing on ROM and quad activation.    Taty Campos, PT, DPT, OCS

## 2019-09-26 ENCOUNTER — CLINICAL SUPPORT (OUTPATIENT)
Dept: REHABILITATION | Facility: HOSPITAL | Age: 49
End: 2019-09-26
Payer: MEDICAID

## 2019-09-26 DIAGNOSIS — M25.561 ACUTE PAIN OF RIGHT KNEE: ICD-10-CM

## 2019-09-26 PROCEDURE — 97110 THERAPEUTIC EXERCISES: CPT

## 2019-09-26 NOTE — PROGRESS NOTES
"  Physical Therapy Daily Treatment Note     Name: Jv Rankin  [facility] Number: [id number]  Therapy Diagnosis:        Encounter Diagnosis   Name Primary?    Acute pain of right knee        Physician: Roxanne Chin PA-C     Physician Orders: PT Eval and Treat   Medical Diagnosis from Referral: S83.211D (ICD-10-CM) - Bucket-handle tear of medial meniscus of right knee as current injury, subsequent encounter  Evaluation Date: 8/5/2019  Authorization Period Expiration: 12/15/2019  Plan of Care Expiration: 11/25/19  Visit # / Visits authorized: 7/32  Visit # total: 8     Time In: 1100  Time Out: 1200  Total Billable Time: 60 minutes  Tx time 60'     Precautions: Standard, Plan initially for toe-touch weight-bearing x 4-6 weeks, flexion 0-90 degrees. Wear brace for 6 weeks. Must be locked in extension when sleeping at night and also when on feet.  Patient given aspirin for DVT prophylaxis.     PROCEDURES PERFORMED on 8/2/19:  1.  Right knee arthroscopic combined all-inside and inside-out medial meniscus repair (CPT 09786, complex modifier-22)  2.  Right knee arthroscopic partial medial meniscectomy (CPT 51328)  3.  Right knee arthroscopic limited synovectomy with notchplasty (CPT 01672)  4.  Right knee arthroscopic chondroplasty (CPT 10426)         Subjective     Pt reports: min discomfort.    He was compliant with home exercise program.  Response to previous treatment: no problems  Functional change:     Pain: 0/10  Location: right knee      Objective     Knee flexion: 121 degrees  Knee extension: 2 hyper    Rogelio received therapeutic exercises to develop strength, endurance, ROM, flexibility, posture and core stabilization for 55 minutes including:  Knee extension stretch 4# thigh/4# shin x 3 min  Quad set 30 x 5"   Passive heel slides 20 x 5"   Bike x 10 min  SLS on airex 3 x 30" R   TKE w/ yellow super band 30 x 5"   Gait training x multiple trials  Calf raises x 30  Calf stretch on slant x 2 min  SL clams OTB 3 x " "30" B  HSS/HCS w/ strap 3 x 30"   Mini squats 3 x 10    ----not performed today---  SLR 2# 3x10/3" B  R SL hip abd 2# 3x10/3"   Prone R hip ext 2# 3x10/3"  R SL hip add 2# 3x10/3"       Rogelio received the following manual therapy techniques: Joint mobilizations and Soft tissue Mobilization were applied to the: R knee  for 5 minutes, including: patella mobs. GSS, and passive knee flexion       Rogelio received cold pack for 10 minutes to to decrease circulation, pain, and swelling.    Home Exercises Provided and Patient Education Provided     Education provided:   Posture awareness, WBAT with brace unlocked    Written Home Exercises Provided: yes.  Exercises were reviewed and Rogelio was able to demonstrate them prior to the end of the session.  Rogelio demonstrated good  understanding of the education provided.     Assessment   Pt tolerated treatment well today.  Continued deficits noted in gait upon arrival, with pt walking with decreased knee flexion throughout swing phase.  Encouraged pt to practice proper heel to toe gait with all walking.  Pt with better understanding.  After gait training, improvements noted, however decreased stance time on the R and lateral lean to the R.  Continue to work on hip and quad strengthening.    Rogelio is progressing well towards his goals.   Pt prognosis is Good.     Pt will continue to benefit from skilled outpatient physical therapy to address the deficits listed in the problem list box on initial evaluation, provide pt/family education and to maximize pt's level of independence in the home and community environment.     Pt's spiritual, cultural and educational needs considered and pt agreeable to plan of care and goals.    Anticipated barriers to physical therapy:     Goals: GOALS: Short Term Goals:  8 weeks  1.Report  decreased R knee pain  < / =  3-4/10  to increase tolerance for walking, driving, ADLs.(not today, progressing)  2. Increase knee ROM to 8 hyperextension to 140 " degrees flexion in order to be able to perform ADLs(not today, progressing) without difficulty.  3. Increase strength to 3+/5 MMT grade in R LE to increase tolerance for ADL and work activities.(not today, progressing)  4. Pt to tolerate HEP to improve ROM and independence with ADL's(not today, progressing)     Long Term Goals: 16 weeks  1.Report decreased R knee pain < / = 0-1/10  to increase tolerance for recreational activities(not today, progressing)  2.Patient goal: To return to being physically active in sports (bike racing, triathlons, rock climbing) /yoga.(not today, progressing)    3.Increase strength to >/= 4+/5 in R LE  to increase tolerance for ADL and work activities.(not today, progressing)  4. Pt will be able to perform SL squat without compensation or c/o pain in order to return to recreational activities      Plan     Continue with POC per protocol, focusing on ROM and quad activation.    Taty Campos, PT, DPT, OCS

## 2019-10-01 ENCOUNTER — CLINICAL SUPPORT (OUTPATIENT)
Dept: REHABILITATION | Facility: HOSPITAL | Age: 49
End: 2019-10-01
Payer: MEDICAID

## 2019-10-01 DIAGNOSIS — M25.561 ACUTE PAIN OF RIGHT KNEE: ICD-10-CM

## 2019-10-01 PROCEDURE — 97110 THERAPEUTIC EXERCISES: CPT

## 2019-10-01 NOTE — PROGRESS NOTES
"  Physical Therapy Daily Treatment Note     Name: Jv Rankin  [facility] Number: [id number]  Therapy Diagnosis:        Encounter Diagnosis   Name Primary?    Acute pain of right knee        Physician: Roxanne hCin PA-C     Physician Orders: PT Eval and Treat   Medical Diagnosis from Referral: S83.211D (ICD-10-CM) - Bucket-handle tear of medial meniscus of right knee as current injury, subsequent encounter  Evaluation Date: 8/5/2019  Authorization Period Expiration: 12/15/2019  Plan of Care Expiration: 11/25/19  Visit # / Visits authorized: 8/32  Visit # total: 9     Time In: 1100  Time Out: 1200  Total Billable Time: 60 minutes  Tx time 60'     Precautions: Standard, Plan initially for toe-touch weight-bearing x 4-6 weeks, flexion 0-90 degrees. Wear brace for 6 weeks. Must be locked in extension when sleeping at night and also when on feet.  Patient given aspirin for DVT prophylaxis.     PROCEDURES PERFORMED on 8/2/19:  1.  Right knee arthroscopic combined all-inside and inside-out medial meniscus repair (CPT 07351, complex modifier-22)  2.  Right knee arthroscopic partial medial meniscectomy (CPT 14867)  3.  Right knee arthroscopic limited synovectomy with notchplasty (CPT 94487)  4.  Right knee arthroscopic chondroplasty (CPT 15930)         Subjective     Pt reports: that he is walking better but doesn't have the full knee flexion yet.    He was compliant with home exercise program.  Response to previous treatment: no problems  Functional change:     Pain: 0/10  Location: right knee      Objective     Knee flexion: 128 degrees  Knee extension: 2 hyper    Rogelio received therapeutic exercises to develop strength, endurance, ROM, flexibility, posture and core stabilization for 55 minutes including:  Knee extension stretch 4# thigh/4# shin x 4 min  Quad set 30 x 5"   Passive heel slides 20 x 5"   Bike x 10 min  SLS on airex 3 x 30" R   Squat then TKE w/ yellow super band 30 x 5"   Ankle mobility on grn box x 30  Gait " "training x multiple trials  Calf raises x 30  Calf stretch on slant x 2 min  SL clams GTB 2 x 1 min B  HSS/HCS w/ strap 3 x 30"         ----not performed today---  SLR 2# 3x10/3" B  R SL hip abd 2# 3x10/3"   Prone R hip ext 2# 3x10/3"  R SL hip add 2# 3x10/3"       Rogelio received the following manual therapy techniques: Joint mobilizations and Soft tissue Mobilization were applied to the: R knee  for 5 minutes, including: patella mobs. GSS, and passive knee flexion       Rogelio received cold pack for 10 minutes to to decrease circulation, pain, and swelling.    Home Exercises Provided and Patient Education Provided     Education provided:   Posture awareness, WBAT with brace unlocked    Written Home Exercises Provided: yes.  Exercises were reviewed and Rogelio was able to demonstrate them prior to the end of the session.  Rogelio demonstrated good  understanding of the education provided.     Assessment   Pt tolerated treatment well today with improved gait upon arrival, however continued limp noted.  Initiated ankle mobility exercises today secondary to stiffness in ankle affecting gait.  Improvement in gait noted afterwards, however pt did c/o lateral plantar pain in R foot, possibly secondary to being on crutches for 13 weeks.  Continue with quad/hip strengthening and gait training.    Rogelio is progressing well towards his goals.   Pt prognosis is Good.     Pt will continue to benefit from skilled outpatient physical therapy to address the deficits listed in the problem list box on initial evaluation, provide pt/family education and to maximize pt's level of independence in the home and community environment.     Pt's spiritual, cultural and educational needs considered and pt agreeable to plan of care and goals.    Anticipated barriers to physical therapy:     Goals: GOALS: Short Term Goals:  8 weeks  1.Report  decreased R knee pain  < / =  3-4/10  to increase tolerance for walking, driving, ADLs.(not today, " progressing)  2. Increase knee ROM to 8 hyperextension to 140 degrees flexion in order to be able to perform ADLs(not today, progressing) without difficulty.  3. Increase strength to 3+/5 MMT grade in R LE to increase tolerance for ADL and work activities.(not today, progressing)  4. Pt to tolerate HEP to improve ROM and independence with ADL's(not today, progressing)     Long Term Goals: 16 weeks  1.Report decreased R knee pain < / = 0-1/10  to increase tolerance for recreational activities(not today, progressing)  2.Patient goal: To return to being physically active in sports (bike racing, triathlons, rock climbing) /yoga.(not today, progressing)    3.Increase strength to >/= 4+/5 in R LE  to increase tolerance for ADL and work activities.(not today, progressing)  4. Pt will be able to perform SL squat without compensation or c/o pain in order to return to recreational activities      Plan     Continue with POC per protocol, focusing on ROM and quad activation.    Taty Campos, PT, DPT, OCS

## 2019-10-03 ENCOUNTER — CLINICAL SUPPORT (OUTPATIENT)
Dept: REHABILITATION | Facility: HOSPITAL | Age: 49
End: 2019-10-03
Payer: MEDICAID

## 2019-10-03 DIAGNOSIS — M25.561 ACUTE PAIN OF RIGHT KNEE: ICD-10-CM

## 2019-10-03 PROCEDURE — 97110 THERAPEUTIC EXERCISES: CPT

## 2019-10-03 NOTE — PROGRESS NOTES
"  Physical Therapy Daily Treatment Note     Name: Jv Rankin  [facility] Number: [id number]  Therapy Diagnosis:        Encounter Diagnosis   Name Primary?    Acute pain of right knee        Physician: Roxanne Chin PA-C     Physician Orders: PT Eval and Treat   Medical Diagnosis from Referral: S83.211D (ICD-10-CM) - Bucket-handle tear of medial meniscus of right knee as current injury, subsequent encounter  Evaluation Date: 8/5/2019  Authorization Period Expiration: 12/15/2019  Plan of Care Expiration: 11/25/19  Visit # / Visits authorized: 9/32  Visit # total: 10     Time In: 1100  Time Out: 1200  Total Billable Time: 35 minutes  Tx time 60'     Precautions: Standard, Plan initially for toe-touch weight-bearing x 4-6 weeks, flexion 0-90 degrees. Wear brace for 6 weeks. Must be locked in extension when sleeping at night and also when on feet.  Patient given aspirin for DVT prophylaxis.     PROCEDURES PERFORMED on 8/2/19:  1.  Right knee arthroscopic combined all-inside and inside-out medial meniscus repair (CPT 24354, complex modifier-22)  2.  Right knee arthroscopic partial medial meniscectomy (CPT 09272)  3.  Right knee arthroscopic limited synovectomy with notchplasty (CPT 54622)  4.  Right knee arthroscopic chondroplasty (CPT 18219)         Subjective     Pt reports: that he has a difficult time performing HEP.     He was compliant with home exercise program.  Response to previous treatment: no problems  Functional change: ambulating without AD, lateral lean to the R, decreased knee extension at IC, and hip hike on the R    Pain: 0/10  Location: right knee      Objective     FOTO intake 10/3/19: 50% limited    Knee flexion: 128 degrees  Knee extension: 2 hyper    Rogelio received therapeutic exercises to develop strength, endurance, ROM, flexibility, posture and core stabilization for 55 minutes including:  Knee extension stretch 4# thigh/4# shin x 4 min  Quad set 30 x 5"   Passive heel slides 20 x 5"   Bike x 10 " "min  Prone quad stretch 3 x 30"   SLS on airex 3 x 30" R   Squat then TKE w/ yellow super band 30 x 5"   Ankle mobility on grn box x 30  Gait training x multiple trials  Calf raises x 30  Calf stretch on slant x 2 min  SL clams GTB 2 x 1 min B NP  Prone hang 4# x 4 min  HSS/HCS w/ strap 3 x 30" NP        ----not performed today---  SLR 2# 3x10/3" B  R SL hip abd 2# 3x10/3"   Prone R hip ext 2# 3x10/3"  R SL hip add 2# 3x10/3"       Rogelio received the following manual therapy techniques: Joint mobilizations and Soft tissue Mobilization were applied to the: R knee  for 5 minutes, including: patella mobs. GSS, and passive knee flexion         Home Exercises Provided and Patient Education Provided     Education provided:   Posture awareness, WBAT with brace unlocked    Written Home Exercises Provided: yes.  Exercises were reviewed and Rogelio was able to demonstrate them prior to the end of the session.  Rogelio demonstrated good  understanding of the education provided.     Assessment   Pt tolerated treatment well today, however continues to present with decreased knee extension upon arrival.  Pt able to achieve full knee extension after joint mobs and stretches, just unable to maintain it.  Encouraged pt to work on knee extension stretching at home Continue with quad/hip strengthening and gait training.    Rogelio is progressing well towards his goals.   Pt prognosis is Good.     Pt will continue to benefit from skilled outpatient physical therapy to address the deficits listed in the problem list box on initial evaluation, provide pt/family education and to maximize pt's level of independence in the home and community environment.     Pt's spiritual, cultural and educational needs considered and pt agreeable to plan of care and goals.    Anticipated barriers to physical therapy:     Goals: GOALS: Short Term Goals:  8 weeks  1.Report  decreased R knee pain  < / =  3-4/10  to increase tolerance for walking, driving, " ADLs.(not today, progressing)  2. Increase knee ROM to 8 hyperextension to 140 degrees flexion in order to be able to perform ADLs(not today, progressing) without difficulty.  3. Increase strength to 3+/5 MMT grade in R LE to increase tolerance for ADL and work activities.(not today, progressing)  4. Pt to tolerate HEP to improve ROM and independence with ADL's(not today, progressing)     Long Term Goals: 16 weeks  1.Report decreased R knee pain < / = 0-1/10  to increase tolerance for recreational activities(not today, progressing)  2.Patient goal: To return to being physically active in sports (bike racing, triathlons, rock climbing) /yoga.(not today, progressing)    3.Increase strength to >/= 4+/5 in R LE  to increase tolerance for ADL and work activities.(not today, progressing)  4. Pt will be able to perform SL squat without compensation or c/o pain in order to return to recreational activities      Plan     Continue with POC per protocol, focusing on ROM and quad activation.    Taty Campos, PT, DPT, OCS

## 2019-10-08 ENCOUNTER — CLINICAL SUPPORT (OUTPATIENT)
Dept: REHABILITATION | Facility: HOSPITAL | Age: 49
End: 2019-10-08
Payer: MEDICAID

## 2019-10-08 DIAGNOSIS — M25.561 ACUTE PAIN OF RIGHT KNEE: ICD-10-CM

## 2019-10-08 PROCEDURE — 97110 THERAPEUTIC EXERCISES: CPT

## 2019-10-08 NOTE — PROGRESS NOTES
"  Physical Therapy Daily Treatment Note     Name: Jv Rankin  [facility] Number: [id number]  Therapy Diagnosis:        Encounter Diagnosis   Name Primary?    Acute pain of right knee        Physician: Roxanne Chin PA-C     Physician Orders: PT Eval and Treat   Medical Diagnosis from Referral: S83.211D (ICD-10-CM) - Bucket-handle tear of medial meniscus of right knee as current injury, subsequent encounter  Evaluation Date: 8/5/2019  Authorization Period Expiration: 12/15/2019  Plan of Care Expiration: 11/25/19  Visit # / Visits authorized: 10/32  Visit # total: 11     Time In: 1100  Time Out: 1200  Total Billable Time: 35 minutes  Tx time 60'     Precautions: Standard, Plan initially for toe-touch weight-bearing x 4-6 weeks, flexion 0-90 degrees. Wear brace for 6 weeks. Must be locked in extension when sleeping at night and also when on feet.  Patient given aspirin for DVT prophylaxis.     PROCEDURES PERFORMED on 8/2/19:  1.  Right knee arthroscopic combined all-inside and inside-out medial meniscus repair (CPT 22340, complex modifier-22)  2.  Right knee arthroscopic partial medial meniscectomy (CPT 49057)  3.  Right knee arthroscopic limited synovectomy with notchplasty (CPT 06893)  4.  Right knee arthroscopic chondroplasty (CPT 69716)         Subjective     Pt reports: that he has a difficult time performing HEP.     He was compliant with home exercise program.  Response to previous treatment: no problems  Functional change: ambulating without AD, lateral lean to the R, decreased knee extension at IC, and hip hike on the R    Pain: 0/10  Location: right knee      Objective     FOTO intake 10/3/19: 50% limited    Knee flexion: 128 degrees  Knee extension: 2 hyper    Rogelio received therapeutic exercises to develop strength, endurance, ROM, flexibility, posture and core stabilization for 55 minutes including:  Knee extension stretch 4# thigh/4# shin x 4 min NP  Quad set 30 x 5"   Passive heel slides 20 x 5"   Bike x " "10 min  Prone quad stretch 3 x 30"   SLS on airex 3 x 30" R   Squat then TKE w/ yellow super band 30 x 5"   Ankle mobility on red box 15# x 30  Gait training x multiple trials  Step ups 6" 3 x 10 R  Step downs 3" 3 x 10 R  Calf raises x 30 B  Calf stretch on slant x 2 min  SL clams GTB 2 x 1 min B NP  Prone hang 4# x 4 min NP  HSS/HCS w/ strap 3 x 30" NP        ----not performed today---  SLR 2# 3x10/3" B  R SL hip abd 2# 3x10/3"   Prone R hip ext 2# 3x10/3"  R SL hip add 2# 3x10/3"       Rogelio received the following manual therapy techniques: Joint mobilizations and Soft tissue Mobilization were applied to the: R knee  for 5 minutes, including: patella mobs. GSS, and passive knee flexion         Home Exercises Provided and Patient Education Provided     Education provided:   Posture awareness, gait training    Written Home Exercises Provided: yes.  Exercises were reviewed and Rogelio was able to demonstrate them prior to the end of the session.  Rogelio demonstrated good  understanding of the education provided.     Assessment   Pt tolerated treatment well today with full knee extension upon arrival, however impaired gait with increased hip circumduction and decreased knee flexion during swing phase.  Pt able to reach 130 degrees of knee flexion today with improved gait after bike and stretching.  Initiated step ups and step downs today to improve strength and functional mobility.  Min medial knee pain with step downs.  Progress as tolerated.      Rogelio is progressing well towards his goals.   Pt prognosis is Good.     Pt will continue to benefit from skilled outpatient physical therapy to address the deficits listed in the problem list box on initial evaluation, provide pt/family education and to maximize pt's level of independence in the home and community environment.     Pt's spiritual, cultural and educational needs considered and pt agreeable to plan of care and goals.    Anticipated barriers to physical " therapy:     Goals: GOALS: Short Term Goals:  8 weeks  1.Report  decreased R knee pain  < / =  3-4/10  to increase tolerance for walking, driving, ADLs.(not today, progressing)  2. Increase knee ROM to 8 hyperextension to 140 degrees flexion in order to be able to perform ADLs(not today, progressing) without difficulty.  3. Increase strength to 3+/5 MMT grade in R LE to increase tolerance for ADL and work activities.(not today, progressing)  4. Pt to tolerate HEP to improve ROM and independence with ADL's(not today, progressing)     Long Term Goals: 16 weeks  1.Report decreased R knee pain < / = 0-1/10  to increase tolerance for recreational activities(not today, progressing)  2.Patient goal: To return to being physically active in sports (bike racing, triathlons, rock climbing) /yoga.(not today, progressing)    3.Increase strength to >/= 4+/5 in R LE  to increase tolerance for ADL and work activities.(not today, progressing)  4. Pt will be able to perform SL squat without compensation or c/o pain in order to return to recreational activities      Plan     Continue with POC per protocol, focusing on ROM and quad activation.    Taty Campos, PT, DPT, OCS

## 2019-10-10 ENCOUNTER — CLINICAL SUPPORT (OUTPATIENT)
Dept: REHABILITATION | Facility: HOSPITAL | Age: 49
End: 2019-10-10
Payer: MEDICAID

## 2019-10-10 DIAGNOSIS — M25.561 ACUTE PAIN OF RIGHT KNEE: ICD-10-CM

## 2019-10-10 PROCEDURE — 97110 THERAPEUTIC EXERCISES: CPT

## 2019-10-10 NOTE — PROGRESS NOTES
"  Physical Therapy Daily Treatment Note     Name: Jv Rankin  [facility] Number: [id number]  Therapy Diagnosis:        Encounter Diagnosis   Name Primary?    Acute pain of right knee        Physician: Roxanne Chin PA-C     Physician Orders: PT Eval and Treat   Medical Diagnosis from Referral: S83.211D (ICD-10-CM) - Bucket-handle tear of medial meniscus of right knee as current injury, subsequent encounter  Evaluation Date: 8/5/2019  Authorization Period Expiration: 12/15/2019  Plan of Care Expiration: 11/25/19  Visit # / Visits authorized: 11/32  Visit # total: 12     Time In: 1100  Time Out: 1205  Total Billable Time: 40 minutes  Tx time 65'     Precautions: Standard, Plan initially for toe-touch weight-bearing x 4-6 weeks, flexion 0-90 degrees. Wear brace for 6 weeks. Must be locked in extension when sleeping at night and also when on feet.  Patient given aspirin for DVT prophylaxis.     PROCEDURES PERFORMED on 8/2/19:  1.  Right knee arthroscopic combined all-inside and inside-out medial meniscus repair (CPT 54546, complex modifier-22)  2.  Right knee arthroscopic partial medial meniscectomy (CPT 00991)  3.  Right knee arthroscopic limited synovectomy with notchplasty (CPT 06286)  4.  Right knee arthroscopic chondroplasty (CPT 35483)         Subjective     Pt reports: that he has a difficult time performing HEP.     He was compliant with home exercise program.  Response to previous treatment: no problems  Functional change: ambulating without AD, lateral lean to the R, decreased knee extension at IC, and hip hike on the R    Pain: 0/10  Location: right knee      Objective     FOTO intake 10/3/19: 50% limited    Knee flexion: 128 degrees  Knee extension: 2 hyper    Rogelio received therapeutic exercises to develop strength, endurance, ROM, flexibility, posture and core stabilization for 60 minutes including:  Knee extension stretch 4# thigh/4# shin x 4 min NP  Quad set 30 x 5"   Passive heel slides 20 x 5"   Bike x " "10 min  Prone quad stretch 3 x 30"   SLS on airex w/ plyotoss 2 x 30 R   Squat then TKE w/ yellow super band 30 x 5" NP  Leg press 120# 3 x 10 DL  Leg press 60# 3 x 10 R only  Step ups 6" 3 x 10 R  Step downs 4" 3 x 10 R  Calf raises x 30 B  Calf stretch on slant x 2 min  HSS/HCS w/ strap 3 x 30"   Side stepping OTB x 2 laps         Rogelio received the following manual therapy techniques: Joint mobilizations and Soft tissue Mobilization were applied to the: R knee  for 5 minutes, including: patella mobs. GSS, and passive knee flexion         Home Exercises Provided and Patient Education Provided     Education provided:   Posture awareness, gait training    Written Home Exercises Provided: yes.  Exercises were reviewed and Rogelio was able to demonstrate them prior to the end of the session.  Rogelio demonstrated good  understanding of the education provided.     Assessment   Pt tolerated treatment well today with improved gait upon arrival.  Pt with good knee extension at IC and knee flexion throughout swing phase.  Pt able to perform step downs on 4" step today with good form and no c/o pain.  Extender used.  Progress as tolerated.    Rogelio is progressing well towards his goals.   Pt prognosis is Good.     Pt will continue to benefit from skilled outpatient physical therapy to address the deficits listed in the problem list box on initial evaluation, provide pt/family education and to maximize pt's level of independence in the home and community environment.     Pt's spiritual, cultural and educational needs considered and pt agreeable to plan of care and goals.    Anticipated barriers to physical therapy:     Goals: GOALS: Short Term Goals:  8 weeks  1.Report  decreased R knee pain  < / =  3-4/10  to increase tolerance for walking, driving, ADLs.(not today, progressing)  2. Increase knee ROM to 8 hyperextension to 140 degrees flexion in order to be able to perform ADLs(not today, progressing) without " difficulty.  3. Increase strength to 3+/5 MMT grade in R LE to increase tolerance for ADL and work activities.(not today, progressing)  4. Pt to tolerate HEP to improve ROM and independence with ADL's(not today, progressing)     Long Term Goals: 16 weeks  1.Report decreased R knee pain < / = 0-1/10  to increase tolerance for recreational activities(not today, progressing)  2.Patient goal: To return to being physically active in sports (bike racing, triathlons, rock climbing) /yoga.(not today, progressing)    3.Increase strength to >/= 4+/5 in R LE  to increase tolerance for ADL and work activities.(not today, progressing)  4. Pt will be able to perform SL squat without compensation or c/o pain in order to return to recreational activities      Plan     Continue with POC per protocol, focusing on ROM and quad activation.    Taty Campos, PT, DPT, OCS

## 2019-10-18 ENCOUNTER — CLINICAL SUPPORT (OUTPATIENT)
Dept: REHABILITATION | Facility: HOSPITAL | Age: 49
End: 2019-10-18
Payer: MEDICAID

## 2019-10-18 DIAGNOSIS — M25.561 ACUTE PAIN OF RIGHT KNEE: ICD-10-CM

## 2019-10-18 PROCEDURE — 97140 MANUAL THERAPY 1/> REGIONS: CPT

## 2019-10-18 PROCEDURE — 97110 THERAPEUTIC EXERCISES: CPT

## 2019-10-18 NOTE — PROGRESS NOTES
"  Physical Therapy Daily Treatment Note     Name: Jv Rankin  [facility] Number: [id number]  Therapy Diagnosis:        Encounter Diagnosis   Name Primary?    Acute pain of right knee        Physician: Roxanne Chin PA-C     Physician Orders: PT Eval and Treat   Medical Diagnosis from Referral: S83.211D (ICD-10-CM) - Bucket-handle tear of medial meniscus of right knee as current injury, subsequent encounter  Evaluation Date: 8/5/2019  Authorization Period Expiration: 12/15/2019  Plan of Care Expiration: 11/25/19  Visit # / Visits authorized: 11/32  Visit # total: 12     Time In: 1100  Time Out: 1205  Total Billable Time: 45 minutes  Tx time 60     Precautions: Standard, Plan initially for toe-touch weight-bearing x 4-6 weeks, flexion 0-90 degrees. Wear brace for 6 weeks. Must be locked in extension when sleeping at night and also when on feet.  Patient given aspirin for DVT prophylaxis.     PROCEDURES PERFORMED on 8/2/19:  1.  Right knee arthroscopic combined all-inside and inside-out medial meniscus repair (CPT 51451, complex modifier-22)  2.  Right knee arthroscopic partial medial meniscectomy (CPT 43458)  3.  Right knee arthroscopic limited synovectomy with notchplasty (CPT 73914)  4.  Right knee arthroscopic chondroplasty (CPT 05794)         Subjective     Pt reports: min pain at night "waking me up". No pain at present time    He was compliant with home exercise program.  Response to previous treatment: no problems  Functional change: ambulating without AD, lateral lean to the R, decreased knee extension at IC, and hip hike on the R    Pain: 0/10  Location: right knee      Objective   Min edema noted on R knee.   Knee flexion: 128 degrees AROM, 132' PROM   Knee extension: 2 hyper    Rogelio received therapeutic exercises to develop strength, endurance, ROM, flexibility, posture and core stabilization for 60 minutes including:  HS with QS 3x10/3"  Passive heel slides 20 x 5"   Bike x 10 min for increased " "Circulation, mm endurance and ROM  Leg press 120# 3 x 10 DL  Leg press 60# 3 x 10 R only  SLS on airex w/ plyotoss 2 x 30 R  R SLS RDL 10# KB 3x10  Step ups 6" 3 x 10 R     Not today  Squat then TKE w/ yellow super band 30 x 5" NP  Step downs 4" 3 x 10 R  Calf raises x 30 B  Calf stretch on slant x 2 min  HSS/HCS w/ strap 3 x 30"   Side stepping OTB x 2 laps     Rogelio received the following manual therapy techniques: Joint mobilizations and Soft tissue Mobilization were applied to the: R knee  for 10 minutes, including: patella mobs. GSS, Prone passive knee flexion, contract/relax knee flex         Home Exercises Provided and Patient Education Provided     Education provided:   Posture awareness, gait training    Written Home Exercises Provided: yes.  Exercises were reviewed and Rogelio was able to demonstrate them prior to the end of the session.  Rogelio demonstrated good  understanding of the education provided.     Assessment   Pt tolerated treatment well today. Improved PROM knee flexion today after stretching along with improved gait pattern. Progress as tolerated.    Rogelio is progressing well towards his goals.   Pt prognosis is Good.     Pt will continue to benefit from skilled outpatient physical therapy to address the deficits listed in the problem list box on initial evaluation, provide pt/family education and to maximize pt's level of independence in the home and community environment.     Pt's spiritual, cultural and educational needs considered and pt agreeable to plan of care and goals.    Anticipated barriers to physical therapy:     Goals: GOALS: Short Term Goals:  8 weeks  1.Report  decreased R knee pain  < / =  3-4/10  to increase tolerance for walking, driving, ADLs.(not today, progressing)  2. Increase knee ROM to 8 hyperextension to 140 degrees flexion in order to be able to perform ADLs(not today, progressing) without difficulty.  3. Increase strength to 3+/5 MMT grade in R LE to increase " tolerance for ADL and work activities.(not today, progressing)  4. Pt to tolerate HEP to improve ROM and independence with ADL's(not today, progressing)     Long Term Goals: 16 weeks  1.Report decreased R knee pain < / = 0-1/10  to increase tolerance for recreational activities(not today, progressing)  2.Patient goal: To return to being physically active in sports (bike racing, triathlons, rock climbing) /yoga.(not today, progressing)    3.Increase strength to >/= 4+/5 in R LE  to increase tolerance for ADL and work activities.(not today, progressing)  4. Pt will be able to perform SL squat without compensation or c/o pain in order to return to recreational activities      Plan     Continue with POC per protocol, focusing on ROM and quad activation.    Josue Cole PTA,

## 2019-10-22 ENCOUNTER — CLINICAL SUPPORT (OUTPATIENT)
Dept: REHABILITATION | Facility: HOSPITAL | Age: 49
End: 2019-10-22
Payer: MEDICAID

## 2019-10-22 DIAGNOSIS — M25.561 ACUTE PAIN OF RIGHT KNEE: ICD-10-CM

## 2019-10-22 PROCEDURE — 97110 THERAPEUTIC EXERCISES: CPT

## 2019-10-22 NOTE — PROGRESS NOTES
"  Physical Therapy Daily Treatment Note     Name: Jv Rankin  [facility] Number: [id number]  Therapy Diagnosis:        Encounter Diagnosis   Name Primary?    Acute pain of right knee        Physician: Roxanne Chin PA-C     Physician Orders: PT Eval and Treat   Medical Diagnosis from Referral: S83.211D (ICD-10-CM) - Bucket-handle tear of medial meniscus of right knee as current injury, subsequent encounter  Evaluation Date: 8/5/2019  Authorization Period Expiration: 12/15/2019  Plan of Care Expiration: 11/25/19  Visit # / Visits authorized: 13/32  Visit # total: 14     Time In: 1010  Time Out: 1105  Total Billable Time: 40 minutes  Tx time 55     Precautions: Standard, Plan initially for toe-touch weight-bearing x 4-6 weeks, flexion 0-90 degrees. Wear brace for 6 weeks. Must be locked in extension when sleeping at night and also when on feet.  Patient given aspirin for DVT prophylaxis.     PROCEDURES PERFORMED on 8/2/19:  1.  Right knee arthroscopic combined all-inside and inside-out medial meniscus repair (CPT 37738, complex modifier-22)  2.  Right knee arthroscopic partial medial meniscectomy (CPT 95240)  3.  Right knee arthroscopic limited synovectomy with notchplasty (CPT 36819)  4.  Right knee arthroscopic chondroplasty (CPT 98061)         Subjective     Pt reports: no pain today.  He was compliant with home exercise program.  Response to previous treatment: no problems  Functional change: ambulating without AD, lateral lean to the R, decreased knee extension at IC, and hip hike on the R    Pain: 0/10  Location: right knee      Objective       Rogelio received therapeutic exercises to develop strength, endurance, ROM, flexibility, posture and core stabilization for 60 minutes including:  Bike x 10 min for increased Circulation, mm endurance and ROM  Prone quad stretch 3 x 30"   Leg press 130# 3 x 10 DL  Leg press 90# 3 x 10 R only  SLS on airex w/ plyotoss 2 x 30 R  SL RDL 10# KB 3x10 B  Step ups 6" 3 x 10 " "R  Step downs 6" 3 x 10 R  Calf raises x 30 B  Calf stretch on slant x 2 min  HSS/HCS w/ strap 3 x 30"   Side stepping OTB x 2 laps       Rogelio received the following manual therapy techniques: Joint mobilizations and Soft tissue Mobilization were applied to the: R knee  for 0 minutes, including: patella mobs. GSS, Prone passive knee flexion, contract/relax knee flex         Home Exercises Provided and Patient Education Provided     Education provided:   Posture awareness, gait training    Written Home Exercises Provided: yes.  Exercises were reviewed and Rogelio was able to demonstrate them prior to the end of the session.  Rogelio demonstrated good  understanding of the education provided.     Assessment   Pt tolerated treatment well today with full knee extension ROM upon arrival.  Pt with decreased swelling in knee and improved quad function.  Able to progress to 6" step downs and progress weight on leg press as well.  Pt with continued improved gait and functional mobility.  Extender used.  Will progress as tolerated.    Rogelio is progressing well towards his goals.   Pt prognosis is Good.     Pt will continue to benefit from skilled outpatient physical therapy to address the deficits listed in the problem list box on initial evaluation, provide pt/family education and to maximize pt's level of independence in the home and community environment.     Pt's spiritual, cultural and educational needs considered and pt agreeable to plan of care and goals.    Anticipated barriers to physical therapy:     Goals: GOALS: Short Term Goals:  8 weeks  1.Report  decreased R knee pain  < / =  3-4/10  to increase tolerance for walking, driving, ADLs.(not today, progressing)  2. Increase knee ROM to 8 hyperextension to 140 degrees flexion in order to be able to perform ADLs(not today, progressing) without difficulty.  3. Increase strength to 3+/5 MMT grade in R LE to increase tolerance for ADL and work activities.(not today, " progressing)  4. Pt to tolerate HEP to improve ROM and independence with ADL's(not today, progressing)     Long Term Goals: 16 weeks  1.Report decreased R knee pain < / = 0-1/10  to increase tolerance for recreational activities(not today, progressing)  2.Patient goal: To return to being physically active in sports (bike racing, triathlons, rock climbing) /yoga.(not today, progressing)    3.Increase strength to >/= 4+/5 in R LE  to increase tolerance for ADL and work activities.(not today, progressing)  4. Pt will be able to perform SL squat without compensation or c/o pain in order to return to recreational activities      Plan     Continue with POC per protocol, focusing on ROM and quad activation.    Taty Campos, PT, DPT, OCS

## 2019-10-23 NOTE — PROGRESS NOTES
S:Jv Rankin presents for post-operative evaluation.     DATE OF PROCEDURE: 8/2/2019   PROCEDURES PERFORMED:   1.  Right knee arthroscopic combined all-inside and inside-out medial meniscus repair  2.  Right knee arthroscopic partial medial meniscectomy  3.  Right knee arthroscopic limited synovectomy with notchplasty  4.  Right knee arthroscopic chondroplasty    Jv Rankin reports to be doing very well 12 wk s/p the above mentioned procedure. Presents today FWB without assistance. Going to PT 2xWeek at the [facility] location. Reports good progress daily. No sig pain complaints. No swelling issues. No mechanical symptoms. Pleased thus far.     O: RLE - The incisions are well healed. No signs of infection. No significant pain or unusual tenderness. Non tender along the medial joint line. Good quad bulk and tone. Full active extension. Passive flexion to 130 without pain. Appropriate patellar mobility. NVI. Intact saphenous nerve function.     A/P: Arthroscopic pictures were reviewed with the patient. Plan to follow the rehab plan as previously outlined. Discussed no lunges, running, deep squats or cutting/pivoting activity. May begin running progression at 5 months. 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 RTC in 2 months. All questions answered.

## 2019-10-24 ENCOUNTER — OFFICE VISIT (OUTPATIENT)
Dept: SPORTS MEDICINE | Facility: CLINIC | Age: 49
End: 2019-10-24
Payer: MEDICAID

## 2019-10-24 ENCOUNTER — CLINICAL SUPPORT (OUTPATIENT)
Dept: REHABILITATION | Facility: HOSPITAL | Age: 49
End: 2019-10-24
Payer: MEDICAID

## 2019-10-24 VITALS
WEIGHT: 176 LBS | SYSTOLIC BLOOD PRESSURE: 131 MMHG | HEART RATE: 57 BPM | HEIGHT: 69 IN | DIASTOLIC BLOOD PRESSURE: 84 MMHG | BODY MASS INDEX: 26.07 KG/M2

## 2019-10-24 DIAGNOSIS — Z47.89 SURGICAL AFTERCARE, MUSCULOSKELETAL SYSTEM: ICD-10-CM

## 2019-10-24 DIAGNOSIS — M25.561 ACUTE PAIN OF RIGHT KNEE: ICD-10-CM

## 2019-10-24 DIAGNOSIS — Z98.890 S/P RIGHT KNEE ARTHROSCOPY: Primary | ICD-10-CM

## 2019-10-24 PROCEDURE — 99024 PR POST-OP FOLLOW-UP VISIT: ICD-10-PCS | Mod: ,,, | Performed by: ORTHOPAEDIC SURGERY

## 2019-10-24 PROCEDURE — 99024 POSTOP FOLLOW-UP VISIT: CPT | Mod: ,,, | Performed by: ORTHOPAEDIC SURGERY

## 2019-10-24 PROCEDURE — 97530 THERAPEUTIC ACTIVITIES: CPT

## 2019-10-24 PROCEDURE — 99213 OFFICE O/P EST LOW 20 MIN: CPT | Mod: PBBFAC | Performed by: ORTHOPAEDIC SURGERY

## 2019-10-24 PROCEDURE — 99999 PR PBB SHADOW E&M-EST. PATIENT-LVL III: CPT | Mod: PBBFAC,,, | Performed by: ORTHOPAEDIC SURGERY

## 2019-10-24 PROCEDURE — 99999 PR PBB SHADOW E&M-EST. PATIENT-LVL III: ICD-10-PCS | Mod: PBBFAC,,, | Performed by: ORTHOPAEDIC SURGERY

## 2019-10-24 PROCEDURE — 97110 THERAPEUTIC EXERCISES: CPT

## 2019-10-24 NOTE — PROGRESS NOTES
"  Physical Therapy Daily Treatment Note     Name: Jv Rankin  [facility] Number: [id number]  Therapy Diagnosis:        Encounter Diagnosis   Name Primary?    Acute pain of right knee        Physician: Roxanne Chin PA-C     Physician Orders: PT Eval and Treat   Medical Diagnosis from Referral: S83.211D (ICD-10-CM) - Bucket-handle tear of medial meniscus of right knee as current injury, subsequent encounter  Evaluation Date: 8/5/2019  Authorization Period Expiration: 12/15/2019  Plan of Care Expiration: 11/25/19  Visit # / Visits authorized: 14/32  Visit # total: 15     Time In: 1100  Time Out: 1155  Total Billable Time: 55 minutes  Tx time 55     Precautions: Standard, Plan initially for toe-touch weight-bearing x 4-6 weeks, flexion 0-90 degrees. Wear brace for 6 weeks. Must be locked in extension when sleeping at night and also when on feet.  Patient given aspirin for DVT prophylaxis.     PROCEDURES PERFORMED on 8/2/19:  1.  Right knee arthroscopic combined all-inside and inside-out medial meniscus repair (CPT 48700, complex modifier-22)  2.  Right knee arthroscopic partial medial meniscectomy (CPT 33046)  3.  Right knee arthroscopic limited synovectomy with notchplasty (CPT 28042)  4.  Right knee arthroscopic chondroplasty (CPT 19504)         Subjective     Pt reports: no pain today.  He was compliant with home exercise program.  Response to previous treatment: no problems  Functional change: ambulating without AD, lateral lean to the R, decreased knee extension at IC, and hip hike on the R    Pain: 0/10  Location: right knee      Objective       Rogelio received therapeutic exercises to develop strength, endurance, ROM, flexibility, posture and core stabilization for 45 minutes including:  Bike x 10 min for increased Circulation, mm endurance and ROM  Prone quad stretch 3 x 30"   Shuttle 100# DL 3 x 10  Sidelying shuttle 50# SL 3 x 10 R only  SLS on airex w/ plyotoss 3 x 30 R  SL RDL 15# KB 3x10 B  Step downs 6" 3 " "x 10 R  Calf raises off step x 30 B  Calf stretch on slant x 2 min  Side stepping OTB x 2 laps   HSS/HCS w/ strap 3 x 30"       Rogelio participated in dynamic functional therapeutic activities to improve functional performance for 15 minutes, including:  Dynamic stretching - gluts, quads, toy solider, lateral lunges x 1 lap each         Rogelio received the following manual therapy techniques: Joint mobilizations and Soft tissue Mobilization were applied to the: R knee  for 0 minutes, including: patella mobs. GSS, Prone passive knee flexion, contract/relax knee flex         Home Exercises Provided and Patient Education Provided     Education provided:   Posture awareness, gait training    Written Home Exercises Provided: yes.  Exercises were reviewed and Rogelio was able to demonstrate them prior to the end of the session.  Rogelio demonstrated good  understanding of the education provided.     Assessment   Pt tolerated treatment well today with addition of dynamic warm up.  Pt required VC/TC for proper lateral lunges.  Improvements noted in technique throughout exercises today.  However, pt did present with some impaired gait (decreased knee flexion during swing phase) when walking fast.  Will continue to progress as tolerated.      Rogelio is progressing well towards his goals.   Pt prognosis is Good.     Pt will continue to benefit from skilled outpatient physical therapy to address the deficits listed in the problem list box on initial evaluation, provide pt/family education and to maximize pt's level of independence in the home and community environment.     Pt's spiritual, cultural and educational needs considered and pt agreeable to plan of care and goals.    Anticipated barriers to physical therapy:     Goals: GOALS: Short Term Goals:  8 weeks  1.Report  decreased R knee pain  < / =  3-4/10  to increase tolerance for walking, driving, ADLs.(not today, progressing)  2. Increase knee ROM to 8 hyperextension to 140 " degrees flexion in order to be able to perform ADLs(not today, progressing) without difficulty.  3. Increase strength to 3+/5 MMT grade in R LE to increase tolerance for ADL and work activities.(not today, progressing)  4. Pt to tolerate HEP to improve ROM and independence with ADL's(not today, progressing)     Long Term Goals: 16 weeks  1.Report decreased R knee pain < / = 0-1/10  to increase tolerance for recreational activities(not today, progressing)  2.Patient goal: To return to being physically active in sports (bike racing, triathlons, rock climbing) /yoga.(not today, progressing)    3.Increase strength to >/= 4+/5 in R LE  to increase tolerance for ADL and work activities.(not today, progressing)  4. Pt will be able to perform SL squat without compensation or c/o pain in order to return to recreational activities      Plan     Continue with POC per protocol, focusing on ROM and quad activation.    Taty Campos, PT, DPT, OCS

## 2019-10-29 ENCOUNTER — CLINICAL SUPPORT (OUTPATIENT)
Dept: REHABILITATION | Facility: HOSPITAL | Age: 49
End: 2019-10-29
Payer: MEDICAID

## 2019-10-29 DIAGNOSIS — M25.561 ACUTE PAIN OF RIGHT KNEE: ICD-10-CM

## 2019-10-29 PROCEDURE — 97530 THERAPEUTIC ACTIVITIES: CPT

## 2019-10-29 PROCEDURE — 97150 GROUP THERAPEUTIC PROCEDURES: CPT

## 2019-10-29 PROCEDURE — 97110 THERAPEUTIC EXERCISES: CPT

## 2019-10-29 NOTE — PROGRESS NOTES
"  Physical Therapy Daily Treatment Note     Name: Jv Rankin  [facility] Number: [id number]  Therapy Diagnosis:        Encounter Diagnosis   Name Primary?    Acute pain of right knee        Physician: Roxanne Chin PA-C     Physician Orders: PT Eval and Treat   Medical Diagnosis from Referral: S83.211D (ICD-10-CM) - Bucket-handle tear of medial meniscus of right knee as current injury, subsequent encounter  Evaluation Date: 8/5/2019  Authorization Period Expiration: 12/15/2019  Plan of Care Expiration: 11/25/19  Visit # / Visits authorized: 16/32  Visit # total: 16     Time In: 1115  Time Out: 1200  Total Billable Time: 30 minutes  Tx time 45     Precautions: Standard, Plan initially for toe-touch weight-bearing x 4-6 weeks, flexion 0-90 degrees. Wear brace for 6 weeks. Must be locked in extension when sleeping at night and also when on feet.  Patient given aspirin for DVT prophylaxis.     PROCEDURES PERFORMED on 8/2/19:  1.  Right knee arthroscopic combined all-inside and inside-out medial meniscus repair (CPT 16738, complex modifier-22)  2.  Right knee arthroscopic partial medial meniscectomy (CPT 02015)  3.  Right knee arthroscopic limited synovectomy with notchplasty (CPT 93494)  4.  Right knee arthroscopic chondroplasty (CPT 53969)         Subjective     Pt reports: min pain at present time. "its annoying pain". Stated no aggravating factors, "just life".    He was compliant with home exercise program.  Response to previous treatment: soreness  Functional change: min limp    Pain: 2/10  Location: right knee      Objective   Antalgic gait pattern noted    Rogelio received therapeutic exercises to develop strength, endurance, ROM, flexibility, posture and core stabilization for 20'  minutes including:  Watt bike 5' increased circulation and ROM  x 10 min- 1 min on/1 min off- increased mm endurance and strength.   Lungs with OH ball 30x       Not today  Prone quad stretch 3 x 30"  Shuttle 100# DL 3 x 10  Sidelying " "shuttle 50# SL 3 x 10 R only  SLS on airex w/ plyotoss 3 x 30 R  Step downs 6" 3 x 10 R  Calf raises off step x 30 B  Calf stretch on slant x 2 min  Side stepping OTB x 2 laps   HSS/HCS w/ strap 3 x 30"       Rogelio participated in dynamic functional therapeutic activities to improve functional performance for 10 minutes, including:  Dynamic stretching -  Toy soldiers, butt kicks, high knee, skipping, lateral shuffle, carioca, skipping 2x/2 laps      Rogelio received the following manual therapy techniques: Joint mobilizations and Soft tissue Mobilization were applied to the: R knee  for 5 minutes, including: Prone passive knee flexion, HSS, over pressure TKE        Home Exercises Provided and Patient Education Provided     Education provided:   Posture awareness, gait training    Written Home Exercises Provided: yes.  Exercises were reviewed and Rogelio was able to demonstrate them prior to the end of the session.  Rogelio demonstrated good  understanding of the education provided.     Assessment   Pt tolerated treatment well today, Continue with dynamic warmup-stretching and watt bike.  VC/TC for proper form/techniqe. .Continue with quad stretching. Will continue to progress as tolerated.      Rogelio is progressing well towards his goals.   Pt prognosis is Good.     Pt will continue to benefit from skilled outpatient physical therapy to address the deficits listed in the problem list box on initial evaluation, provide pt/family education and to maximize pt's level of independence in the home and community environment.     Pt's spiritual, cultural and educational needs considered and pt agreeable to plan of care and goals.    Anticipated barriers to physical therapy:     Goals: GOALS: Short Term Goals:  8 weeks  1.Report  decreased R knee pain  < / =  3-4/10  to increase tolerance for walking, driving, ADLs.(not today, progressing)  2. Increase knee ROM to 8 hyperextension to 140 degrees flexion in order to be able to " perform ADLs(not today, progressing) without difficulty.  3. Increase strength to 3+/5 MMT grade in R LE to increase tolerance for ADL and work activities.(not today, progressing)  4. Pt to tolerate HEP to improve ROM and independence with ADL's(not today, progressing)     Long Term Goals: 16 weeks  1.Report decreased R knee pain < / = 0-1/10  to increase tolerance for recreational activities(not today, progressing)  2.Patient goal: To return to being physically active in sports (bike racing, triathlons, rock climbing) /yoga.(not today, progressing)    3.Increase strength to >/= 4+/5 in R LE  to increase tolerance for ADL and work activities.(not today, progressing)  4. Pt will be able to perform SL squat without compensation or c/o pain in order to return to recreational activities      Plan     Continue with POC per protocol, focusing on ROM and quad activation.    Josue Cole, PTA, STS

## 2019-10-31 ENCOUNTER — CLINICAL SUPPORT (OUTPATIENT)
Dept: REHABILITATION | Facility: HOSPITAL | Age: 49
End: 2019-10-31
Payer: MEDICAID

## 2019-10-31 DIAGNOSIS — M25.561 ACUTE PAIN OF RIGHT KNEE: ICD-10-CM

## 2019-10-31 PROCEDURE — 97110 THERAPEUTIC EXERCISES: CPT

## 2019-10-31 NOTE — PROGRESS NOTES
"  Physical Therapy Daily Treatment Note     Name: Jv Rankin  [facility] Number: [id number]  Therapy Diagnosis:        Encounter Diagnosis   Name Primary?    Acute pain of right knee        Physician: Roxanne Chin PA-C     Physician Orders: PT Eval and Treat   Medical Diagnosis from Referral: S83.211D (ICD-10-CM) - Bucket-handle tear of medial meniscus of right knee as current injury, subsequent encounter  Evaluation Date: 8/5/2019  Authorization Period Expiration: 12/15/2019  Plan of Care Expiration: 11/25/19  Visit # / Visits authorized: 16/32  Visit # total: 17     Time In: 1110  Time Out: 1200  Total Billable Time: 30 minutes  Tx time 40     Precautions: Standard, Plan initially for toe-touch weight-bearing x 4-6 weeks, flexion 0-90 degrees. Wear brace for 6 weeks. Must be locked in extension when sleeping at night and also when on feet.  Patient given aspirin for DVT prophylaxis.     PROCEDURES PERFORMED on 8/2/19:  1.  Right knee arthroscopic combined all-inside and inside-out medial meniscus repair (CPT 70067, complex modifier-22)  2.  Right knee arthroscopic partial medial meniscectomy (CPT 76432)  3.  Right knee arthroscopic limited synovectomy with notchplasty (CPT 05648)  4.  Right knee arthroscopic chondroplasty (CPT 38932)         Subjective     Pt reports: min pain at present time. "its annoying pain". Stated no aggravating factors, "just life".    He was compliant with home exercise program.  Response to previous treatment: soreness  Functional change: min limp    Pain: 2/10  Location: right knee      Objective   Antalgic gait pattern noted    Rogelio received therapeutic exercises to develop strength, endurance, ROM, flexibility, posture and core stabilization for 35'  minutes including:  Watt bike 5' increased circulation and ROM then  x 10 min- 1 min on/1 min off- increased mm endurance and strength.   Dynamic stretching -  Toy soldiers, butt kicks, high knee, skipping, lateral shuffle, carioca, " "skipping 2x/2 laps   Lunges with OH ball 30x       Not today  Prone quad stretch 3 x 30"  Shuttle 100# DL 3 x 10  Sidelying shuttle 50# SL 3 x 10 R only  SLS on airex w/ plyotoss 3 x 30 R  Step downs 6" 3 x 10 R  Calf raises off step x 30 B  Calf stretch on slant x 2 min  Side stepping OTB x 2 laps   HSS/HCS w/ strap 3 x 30"        Rogelio received the following manual therapy techniques: Joint mobilizations and Soft tissue Mobilization were applied to the: R knee  for 5 minutes, including: Prone passive knee flexion, HSS, over pressure TKE    CP post treatment x 10 min    Home Exercises Provided and Patient Education Provided     Education provided:   Posture awareness, gait training    Written Home Exercises Provided: yes.  Exercises were reviewed and Rogelio was able to demonstrate them prior to the end of the session.  Rogelio demonstrated good  understanding of the education provided.     Assessment   Pt tolerated treatment well today.  Pt continues to have decreased R quad flexibility and decreased L HS flexibility.  VCs required for proper lunge technique.  Instructed pt to perform all exercises at home and continue with yoga but modify exercises if increased pain or difficulty.  Progress as tolerated.    Rogelio is progressing well towards his goals.   Pt prognosis is Good.     Pt will continue to benefit from skilled outpatient physical therapy to address the deficits listed in the problem list box on initial evaluation, provide pt/family education and to maximize pt's level of independence in the home and community environment.     Pt's spiritual, cultural and educational needs considered and pt agreeable to plan of care and goals.    Anticipated barriers to physical therapy:     Goals: GOALS: Short Term Goals:  8 weeks  1.Report  decreased R knee pain  < / =  3-4/10  to increase tolerance for walking, driving, ADLs.(not today, progressing)  2. Increase knee ROM to 8 hyperextension to 140 degrees flexion in " order to be able to perform ADLs(not today, progressing) without difficulty.  3. Increase strength to 3+/5 MMT grade in R LE to increase tolerance for ADL and work activities.(not today, progressing)  4. Pt to tolerate HEP to improve ROM and independence with ADL's(not today, progressing)     Long Term Goals: 16 weeks  1.Report decreased R knee pain < / = 0-1/10  to increase tolerance for recreational activities(not today, progressing)  2.Patient goal: To return to being physically active in sports (bike racing, triathlons, rock climbing) /yoga.(not today, progressing)    3.Increase strength to >/= 4+/5 in R LE  to increase tolerance for ADL and work activities.(not today, progressing)  4. Pt will be able to perform SL squat without compensation or c/o pain in order to return to recreational activities      Plan     Continue with POC per protocol, focusing on ROM and quad activation.    Taty Campos, PT, DPT, OCS

## 2019-11-05 ENCOUNTER — CLINICAL SUPPORT (OUTPATIENT)
Dept: REHABILITATION | Facility: HOSPITAL | Age: 49
End: 2019-11-05
Payer: MEDICAID

## 2019-11-05 DIAGNOSIS — M25.561 ACUTE PAIN OF RIGHT KNEE: ICD-10-CM

## 2019-11-05 PROCEDURE — 97110 THERAPEUTIC EXERCISES: CPT

## 2019-11-05 NOTE — PROGRESS NOTES
"  Physical Therapy Daily Treatment Note     Name: Jv Rankin  [facility] Number: [id number]  Therapy Diagnosis:        Encounter Diagnosis   Name Primary?    Acute pain of right knee        Physician: Roxanne Chin PA-C     Physician Orders: PT Eval and Treat   Medical Diagnosis from Referral: S83.211D (ICD-10-CM) - Bucket-handle tear of medial meniscus of right knee as current injury, subsequent encounter  Evaluation Date: 8/5/2019  Authorization Period Expiration: 12/15/2019  Plan of Care Expiration: 11/25/19  Visit # / Visits authorized: 16/32  Visit # total: 18     Time In: 1410  Time Out: 1500  Total Billable Time: 40 minutes  Tx time 50     Precautions: Standard, Plan initially for toe-touch weight-bearing x 4-6 weeks, flexion 0-90 degrees. Wear brace for 6 weeks. Must be locked in extension when sleeping at night and also when on feet.  Patient given aspirin for DVT prophylaxis.     PROCEDURES PERFORMED on 8/2/19:  1.  Right knee arthroscopic combined all-inside and inside-out medial meniscus repair (CPT 30327, complex modifier-22)  2.  Right knee arthroscopic partial medial meniscectomy (CPT 56632)  3.  Right knee arthroscopic limited synovectomy with notchplasty (CPT 58790)  4.  Right knee arthroscopic chondroplasty (CPT 06806)         Subjective     Pt reports: min pain at present time. "its not bothering me as bad now"  l  He was compliant with home exercise program.  Response to previous treatment: soreness  Functional change: improved gait with mild limp    Pain: 2/10  Location: right knee      Objective       Rogelio received therapeutic exercises to develop strength, endurance, ROM, flexibility, posture and core stabilization for 50'  minutes including:  Watt bike 5' increased circulation and ROM then  x 10 min- 1 min on/1 min off- increased mm endurance and strength.   Dynamic stretching -  Toy soldiers, butt kicks, high knee, skipping, lateral shuffle, carioca, 2x/2 laps   Lunges with OH ball 30x   B " "SL  RDL 10# 3x10  Mini squat rocker 3x10    Not today  Prone quad stretch 3 x 30"  Shuttle 100# DL 3 x 10  Sidelying shuttle 50# SL 3 x 10 R only  SLS on airex w/ plyotoss 3 x 30 R  Step downs 6" 3 x 10 R  Calf raises off step x 30 B  Calf stretch on slant x 2 min  Side stepping OTB x 2 laps   HSS/HCS w/ strap 3 x 30"        Rogelio received the following manual therapy techniques: Joint mobilizations and Soft tissue Mobilization were applied to the: R knee  for 0 minutes, including: Prone passive knee flexion, HSS, over pressure TKE    CP post treatment x 10 min    Home Exercises Provided and Patient Education Provided     Education provided:   Posture awareness, gait training    Written Home Exercises Provided: yes.  Exercises were reviewed and Rogelio was able to demonstrate them prior to the end of the session.  Rogelio demonstrated good  understanding of the education provided.     Assessment   Pt tolerated treatment well today.  Pt continues to have decreased R quad flexibility and decreased L HS flexibility.  VCs required for proper lunge technique.  Instructed pt to perform all exercises at home and continue with yoga but modify exercises if increased pain or difficulty.  Progress as tolerated.    Rogelio is progressing well towards his goals.   Pt prognosis is Good.     Pt will continue to benefit from skilled outpatient physical therapy to address the deficits listed in the problem list box on initial evaluation, provide pt/family education and to maximize pt's level of independence in the home and community environment.     Pt's spiritual, cultural and educational needs considered and pt agreeable to plan of care and goals.    Anticipated barriers to physical therapy:     Goals: GOALS: Short Term Goals:  8 weeks  1.Report  decreased R knee pain  < / =  3-4/10  to increase tolerance for walking, driving, ADLs.(not today, progressing)  2. Increase knee ROM to 8 hyperextension to 140 degrees flexion in order to " be able to perform ADLs(not today, progressing) without difficulty.  3. Increase strength to 3+/5 MMT grade in R LE to increase tolerance for ADL and work activities.(not today, progressing)  4. Pt to tolerate HEP to improve ROM and independence with ADL's(not today, progressing)     Long Term Goals: 16 weeks  1.Report decreased R knee pain < / = 0-1/10  to increase tolerance for recreational activities(not today, progressing)  2.Patient goal: To return to being physically active in sports (bike racing, triathlons, rock climbing) /yoga.(not today, progressing)    3.Increase strength to >/= 4+/5 in R LE  to increase tolerance for ADL and work activities.(not today, progressing)  4. Pt will be able to perform SL squat without compensation or c/o pain in order to return to recreational activities      Plan     Continue with POC per protocol, focusing on ROM and quad activation.    Josue Cole, PTA, STS

## 2019-11-07 ENCOUNTER — CLINICAL SUPPORT (OUTPATIENT)
Dept: REHABILITATION | Facility: HOSPITAL | Age: 49
End: 2019-11-07
Payer: MEDICAID

## 2019-11-07 DIAGNOSIS — M25.561 ACUTE PAIN OF RIGHT KNEE: ICD-10-CM

## 2019-11-07 PROCEDURE — 97110 THERAPEUTIC EXERCISES: CPT

## 2019-11-07 NOTE — PROGRESS NOTES
Physical Therapy Daily Treatment Note     Name: Jv Rankin  [facility] Number: [id number]  Therapy Diagnosis:        Encounter Diagnosis   Name Primary?    Acute pain of right knee        Physician: Roxanne Chin PA-C     Physician Orders: PT Eval and Treat   Medical Diagnosis from Referral: S83.211D (ICD-10-CM) - Bucket-handle tear of medial meniscus of right knee as current injury, subsequent encounter  Evaluation Date: 8/5/2019  Authorization Period Expiration: 12/15/2019  Plan of Care Expiration: 11/25/19  Visit # / Visits authorized: 16/32  Visit # total: 18     Time In: 1300  Time Out: 1400  Total Billable Time: 50 minutes  Tx time 60     Precautions: Standard, Plan initially for toe-touch weight-bearing x 4-6 weeks, flexion 0-90 degrees. Wear brace for 6 weeks. Must be locked in extension when sleeping at night and also when on feet.  Patient given aspirin for DVT prophylaxis.     PROCEDURES PERFORMED on 8/2/19:  1.  Right knee arthroscopic combined all-inside and inside-out medial meniscus repair (CPT 35134, complex modifier-22)  2.  Right knee arthroscopic partial medial meniscectomy (CPT 54661)  3.  Right knee arthroscopic limited synovectomy with notchplasty (CPT 75532)  4.  Right knee arthroscopic chondroplasty (CPT 21044)         Subjective     Pt reports: brisk walk yesterday with no limping or pain.   He was compliant with home exercise program.  Response to previous treatment: soreness  Functional change: improved gait with mild limp    Pain: 2/10  Location: right knee      Objective       Rogelio received therapeutic exercises to develop strength, endurance, ROM, flexibility, posture and core stabilization for 50'  minutes including:  Watt bike 5' increased circulation and ROM then  x 10 min- 1 min on/1 min off- increased mm endurance and strength.   Dynamic stretching -  Toy soldiers, butt kicks, high knee, skipping, lateral shuffle, carioca, 4 laps  Monster walks with OTB 2 laps  Lateral sidestep   "OTB 2 laps  Lunges with OH ball 30x   B SL  RDL 10# 3x10  Mini squat on rocker 3x10 np    Not today  Prone quad stretch 3 x 30"  Shuttle 100# DL 3 x 10  Sidelying shuttle 50# SL 3 x 10 R only  SLS on airex w/ plyotoss 3 x 30 R  Step downs 6" 3 x 10 R  Calf raises off step x 30 B  Calf stretch on slant x 2 min  Side stepping OTB x 2 laps   HSS/HCS w/ strap 3 x 30"        Rogelio received the following manual therapy techniques: Joint mobilizations and Soft tissue Mobilization were applied to the: R knee  for 0 minutes, including: Prone passive knee flexion, HSS, over pressure TKE    CP post treatment x 10 min    Home Exercises Provided and Patient Education Provided     Education provided:   Posture awareness, gait training    Written Home Exercises Provided: yes.  Exercises were reviewed and Rogelio was able to demonstrate them prior to the end of the session.  Rogelio demonstrated good  understanding of the education provided.     Assessment   Pt tolerated treatment well today.  VC/TC for correcting form/technique and for  proper lunge technique.  Instructed pt to perform all exercises at home and continue with yoga but modify exercises if increased pain or difficulty.  Progress as tolerated.    Rogelio is progressing well towards his goals.   Pt prognosis is Good.     Pt will continue to benefit from skilled outpatient physical therapy to address the deficits listed in the problem list box on initial evaluation, provide pt/family education and to maximize pt's level of independence in the home and community environment.     Pt's spiritual, cultural and educational needs considered and pt agreeable to plan of care and goals.    Anticipated barriers to physical therapy:     Goals: GOALS: Short Term Goals:  8 weeks  1.Report  decreased R knee pain  < / =  3-4/10  to increase tolerance for walking, driving, ADLs.(not today, progressing)  2. Increase knee ROM to 8 hyperextension to 140 degrees flexion in order to be able " to perform ADLs(not today, progressing) without difficulty.  3. Increase strength to 3+/5 MMT grade in R LE to increase tolerance for ADL and work activities.(not today, progressing)  4. Pt to tolerate HEP to improve ROM and independence with ADL's(not today, progressing)     Long Term Goals: 16 weeks  1.Report decreased R knee pain < / = 0-1/10  to increase tolerance for recreational activities(not today, progressing)  2.Patient goal: To return to being physically active in sports (bike racing, triathlons, rock climbing) /yoga.(not today, progressing)    3.Increase strength to >/= 4+/5 in R LE  to increase tolerance for ADL and work activities.(not today, progressing)  4. Pt will be able to perform SL squat without compensation or c/o pain in order to return to recreational activities      Plan     Continue with POC per protocol, focusing on ROM and quad activation.    Josue Cole, PTA, STS

## 2019-11-12 ENCOUNTER — CLINICAL SUPPORT (OUTPATIENT)
Dept: REHABILITATION | Facility: HOSPITAL | Age: 49
End: 2019-11-12
Payer: MEDICAID

## 2019-11-12 DIAGNOSIS — M25.561 ACUTE PAIN OF RIGHT KNEE: ICD-10-CM

## 2019-11-12 PROCEDURE — 97110 THERAPEUTIC EXERCISES: CPT

## 2019-11-12 NOTE — PROGRESS NOTES
Physical Therapy Daily Treatment Note     Name: Jv Rankin  [facility] Number: [id number]  Therapy Diagnosis:        Encounter Diagnosis   Name Primary?    Acute pain of right knee        Physician: Roxanne Chin PA-C     Physician Orders: PT Eval and Treat   Medical Diagnosis from Referral: S83.211D (ICD-10-CM) - Bucket-handle tear of medial meniscus of right knee as current injury, subsequent encounter  Evaluation Date: 8/5/2019  Authorization Period Expiration: 12/15/2019  Plan of Care Expiration: 11/25/19  Visit # / Visits authorized: 19/32  Visit # total: 20     Time In: 1000  Time Out: 1100  Total Billable Time: 60 minutes  Tx time 60     Precautions: Standard, Plan initially for toe-touch weight-bearing x 4-6 weeks, flexion 0-90 degrees. Wear brace for 6 weeks. Must be locked in extension when sleeping at night and also when on feet.  Patient given aspirin for DVT prophylaxis.     PROCEDURES PERFORMED on 8/2/19:  1.  Right knee arthroscopic combined all-inside and inside-out medial meniscus repair (CPT 28899, complex modifier-22)  2.  Right knee arthroscopic partial medial meniscectomy (CPT 26387)  3.  Right knee arthroscopic limited synovectomy with notchplasty (CPT 28569)  4.  Right knee arthroscopic chondroplasty (CPT 01703)         Subjective     Pt reports: that he has been doing more.  Continues to have numbness in knee.  He was compliant with home exercise program.  Response to previous treatment: soreness  Functional change: improved gait with mild limp    Pain: 2/10  Location: right knee      Objective       Rogelio received therapeutic exercises to develop strength, endurance, ROM, flexibility, posture and core stabilization for 60'  minutes including:  Watt bike 0' increased circulation and ROM then  x 10 min- 1 min on/1 min off- increased mm endurance and strength.   Dynamic stretching -  Toy soldiers, butt kicks, high knee, skipping, lateral shuffle, carioca, 4 laps  Monster walks with GTB 2  "laps  Lateral sidestep  GTB 2 laps  Lunges with OH ball x 2 laps  B SL  RDL 10# 3x10 NP  Step downs 6" 3 x 10 R  Squats (walking hands down weight rack) 3 x 15  Prone quad stretch 3 x 30"  HSS/HCS w/ strap 3 x 30"       Not today  Shuttle 100# DL 3 x 10  Sidelying shuttle 50# SL 3 x 10 R only  SLS on airex w/ plyotoss 3 x 30 R  Calf raises off step x 30 B  Calf stretch on slant x 2 min         Rogelio received the following manual therapy techniques: Joint mobilizations and Soft tissue Mobilization were applied to the: R knee  for 0 minutes, including: Prone passive knee flexion, HSS, over pressure TKE    CP post treatment x 10 min NP    Home Exercises Provided and Patient Education Provided     Education provided:   Posture awareness, gait training    Written Home Exercises Provided: yes.  Exercises were reviewed and Rogelio was able to demonstrate them prior to the end of the session.  Rogelio demonstrated good  understanding of the education provided.     Assessment   Pt tolerated treatment well today.  Continues to ambulate with stiffness in L shoulder/arm when walking.  Encouraged pt to swing arms when walking and during dynamic warm up.  Worked on SL squats and squatting today to further improve functional mobility.  Pt with increased difficulty and continued weakness noted in quad.  However, improved flexibility noted.  Will continue to progress as tolerated.    Rogelio is progressing well towards his goals.   Pt prognosis is Good.     Pt will continue to benefit from skilled outpatient physical therapy to address the deficits listed in the problem list box on initial evaluation, provide pt/family education and to maximize pt's level of independence in the home and community environment.     Pt's spiritual, cultural and educational needs considered and pt agreeable to plan of care and goals.    Anticipated barriers to physical therapy:     Goals: GOALS: Short Term Goals:  8 weeks  1.Report  decreased R knee pain  " < / =  3-4/10  to increase tolerance for walking, driving, ADLs.(not today, progressing)  2. Increase knee ROM to 8 hyperextension to 140 degrees flexion in order to be able to perform ADLs(not today, progressing) without difficulty.  3. Increase strength to 3+/5 MMT grade in R LE to increase tolerance for ADL and work activities.(not today, progressing)  4. Pt to tolerate HEP to improve ROM and independence with ADL's(not today, progressing)     Long Term Goals: 16 weeks  1.Report decreased R knee pain < / = 0-1/10  to increase tolerance for recreational activities(not today, progressing)  2.Patient goal: To return to being physically active in sports (bike racing, triathlons, rock climbing) /yoga.(not today, progressing)    3.Increase strength to >/= 4+/5 in R LE  to increase tolerance for ADL and work activities.(not today, progressing)  4. Pt will be able to perform SL squat without compensation or c/o pain in order to return to recreational activities      Plan     Continue with POC per protocol, focusing on ROM and quad activation.    Taty Campos, PT, DPT, OCS

## 2019-11-15 ENCOUNTER — CLINICAL SUPPORT (OUTPATIENT)
Dept: REHABILITATION | Facility: HOSPITAL | Age: 49
End: 2019-11-15
Payer: MEDICAID

## 2019-11-15 DIAGNOSIS — M25.561 ACUTE PAIN OF RIGHT KNEE: ICD-10-CM

## 2019-11-15 PROCEDURE — 97110 THERAPEUTIC EXERCISES: CPT

## 2019-11-15 NOTE — PROGRESS NOTES
Physical Therapy Daily Treatment Note     Name: Jv Rankin  [facility] Number: [id number]  Therapy Diagnosis:        Encounter Diagnosis   Name Primary?    Acute pain of right knee        Physician: Roxanne Chin PA-C     Physician Orders: PT Eval and Treat   Medical Diagnosis from Referral: S83.211D (ICD-10-CM) - Bucket-handle tear of medial meniscus of right knee as current injury, subsequent encounter  Evaluation Date: 8/5/2019  Authorization Period Expiration: 12/15/2019  Plan of Care Expiration: 11/25/19  Visit # / Visits authorized: 20/32  Visit # total: 21     Time In: 1100  Time Out: 1200  Total Billable Time: 40 minutes  Tx time 60     Precautions: Standard, Plan initially for toe-touch weight-bearing x 4-6 weeks, flexion 0-90 degrees. Wear brace for 6 weeks. Must be locked in extension when sleeping at night and also when on feet.  Patient given aspirin for DVT prophylaxis.     PROCEDURES PERFORMED on 8/2/19:  1.  Right knee arthroscopic combined all-inside and inside-out medial meniscus repair (CPT 23277, complex modifier-22)  2.  Right knee arthroscopic partial medial meniscectomy (CPT 76899)  3.  Right knee arthroscopic limited synovectomy with notchplasty (CPT 56716)  4.  Right knee arthroscopic chondroplasty (CPT 19136)         Subjective     Pt reports: that he has been doing more.  Continues to have numbness in knee.  He was compliant with home exercise program.  Response to previous treatment: soreness  Functional change: improved gait with mild limp    Pain: 2/10  Location: right knee      Objective       Rogelio received therapeutic exercises to develop strength, endurance, ROM, flexibility, posture and core stabilization for 60'  minutes including:  Watt bike 5' increased circulation and ROM then  x 10 min- 1 min on/1 min off- increased mm endurance and strength.   Dynamic stretching -  Toy soldiers, butt kicks, high knee, skipping, lateral shuffle, carioca, 4 laps  Monster walks with GTB 2  "laps  Lateral sidestep  GTB 2 laps  Lunges with OH ball x 1 lap  Lunges with med ball twist x 1 lap  B SL  RDL 10# 3x10 NP  Step downs 6" 3 x 10 R  Squats (walking hands down weight rack) 3 x 15  Calf stretch on slant x 2 min  Prone quad stretch 3 x 30"  HSS/HCS w/ strap 3 x 30"       Not today  Shuttle 100# DL 3 x 10  Sidelying shuttle 50# SL 3 x 10 R only  SLS on airex w/ plyotoss 3 x 30 R  Calf raises off step x 30 B  Calf stretch on slant x 2 min         Rogelio received the following manual therapy techniques: Joint mobilizations and Soft tissue Mobilization were applied to the: R knee  for 0 minutes, including: Prone passive knee flexion, HSS, over pressure TKE    CP post treatment x 10 min NP    Home Exercises Provided and Patient Education Provided     Education provided:   Posture awareness, gait training    Written Home Exercises Provided: yes.  Exercises were reviewed and Rogelio was able to demonstrate them prior to the end of the session.  Rogelio demonstrated good  understanding of the education provided.     Assessment   Pt tolerated treatment well today.  Pt with good ROM and improved strength/motor control.  Flexibility is improving, however pt continues to lack HS flexibility along with HS strength.  Next visit, check ankle mobility and initiate yoga type activities as well as light agilities.      Rogelio is progressing well towards his goals.   Pt prognosis is Good.     Pt will continue to benefit from skilled outpatient physical therapy to address the deficits listed in the problem list box on initial evaluation, provide pt/family education and to maximize pt's level of independence in the home and community environment.     Pt's spiritual, cultural and educational needs considered and pt agreeable to plan of care and goals.    Anticipated barriers to physical therapy:     Goals: GOALS: Short Term Goals:  8 weeks  1.Report  decreased R knee pain  < / =  3-4/10  to increase tolerance for walking, " driving, ADLs.(not today, progressing)  2. Increase knee ROM to 8 hyperextension to 140 degrees flexion in order to be able to perform ADLs(not today, progressing) without difficulty.  3. Increase strength to 3+/5 MMT grade in R LE to increase tolerance for ADL and work activities.(not today, progressing)  4. Pt to tolerate HEP to improve ROM and independence with ADL's(not today, progressing)     Long Term Goals: 16 weeks  1.Report decreased R knee pain < / = 0-1/10  to increase tolerance for recreational activities(not today, progressing)  2.Patient goal: To return to being physically active in sports (bike racing, triathlons, rock climbing) /yoga.(not today, progressing)    3.Increase strength to >/= 4+/5 in R LE  to increase tolerance for ADL and work activities.(not today, progressing)  4. Pt will be able to perform SL squat without compensation or c/o pain in order to return to recreational activities      Plan     Continue with POC per protocol, focusing on ROM and quad activation.    Taty Campos, PT, DPT, OCS

## 2019-11-19 ENCOUNTER — CLINICAL SUPPORT (OUTPATIENT)
Dept: REHABILITATION | Facility: HOSPITAL | Age: 49
End: 2019-11-19
Payer: MEDICAID

## 2019-11-19 DIAGNOSIS — M25.561 ACUTE PAIN OF RIGHT KNEE: ICD-10-CM

## 2019-11-19 PROCEDURE — 97110 THERAPEUTIC EXERCISES: CPT

## 2019-11-19 NOTE — PROGRESS NOTES
Physical Therapy Daily Treatment Note     Name: Jv Rankin  [facility] Number: [id number]  Therapy Diagnosis:        Encounter Diagnosis   Name Primary?    Acute pain of right knee        Physician: Roxanne Chin PA-C     Physician Orders: PT Eval and Treat   Medical Diagnosis from Referral: S83.211D (ICD-10-CM) - Bucket-handle tear of medial meniscus of right knee as current injury, subsequent encounter  Evaluation Date: 8/5/2019  Authorization Period Expiration: 12/15/2019  Plan of Care Expiration: 11/25/19  Visit # / Visits authorized: 21/32  Visit # total: 22     Time In: 1110  Time Out: 1200  Total Billable Time: 25 minutes  Tx time 60     Precautions: Standard, Plan initially for toe-touch weight-bearing x 4-6 weeks, flexion 0-90 degrees. Wear brace for 6 weeks. Must be locked in extension when sleeping at night and also when on feet.  Patient given aspirin for DVT prophylaxis.     PROCEDURES PERFORMED on 8/2/19:  1.  Right knee arthroscopic combined all-inside and inside-out medial meniscus repair (CPT 95690, complex modifier-22)  2.  Right knee arthroscopic partial medial meniscectomy (CPT 16494)  3.  Right knee arthroscopic limited synovectomy with notchplasty (CPT 25450)  4.  Right knee arthroscopic chondroplasty (CPT 15068)         Subjective     Pt reports: that he has no pain.  He was compliant with home exercise program.  Response to previous treatment: soreness  Functional change: improved gait with mild limp    Pain: 0/10  Location: right knee      Objective       Rogelio received therapeutic exercises to develop strength, endurance, ROM, flexibility, posture and core stabilization for 50'  minutes including:  Watt bike 0' increased circulation and ROM then  x 10 min- 1 min on/1 min off- increased mm endurance and strength.   Dynamic stretching - HS scoop, Inch worms w/ push up, WGS x 1 lap each  DL hopping in place 3 x 30  DL hopping fwd/bkwd 3 x 30  DL hopping side to side 3 x 30  Tree pose on  "upside down BOSU 2 x fatigue B   Calf stretch on slant x 2 min      ----not performed today----  Monster walks with GTB 2 laps  Lateral sidestep  GTB 2 laps  Lunges with OH ball x 1 lap  Lunges with med ball twist x 1 lap  B SL  RDL 10# 3x10 NP  Step downs 6" 3 x 10 R  Squats (walking hands down weight rack) 3 x 15  Prone quad stretch 3 x 30"  HSS/HCS w/ strap 3 x 30"   Shuttle 100# DL 3 x 10  Sidelying shuttle 50# SL 3 x 10 R only  Calf raises off step x 30 B           Rogelio received the following manual therapy techniques: Joint mobilizations and Soft tissue Mobilization were applied to the: R knee  for 0 minutes, including: Prone passive knee flexion, HSS, over pressure TKE    CP post treatment x 10 min NP    Home Exercises Provided and Patient Education Provided     Education provided:   Posture awareness, gait training    Written Home Exercises Provided: yes.  Exercises were reviewed and Rogelio was able to demonstrate them prior to the end of the session.  Rogelio demonstrated good  understanding of the education provided.     Assessment     Pt tolerated treatment well today with progression of exercises.  Pt with continued lack of HS flexibility, however it is improving.  Good shock absorption and knee alignment noted with DL hopping today.  Worked on light agilities and more yoga type exercises today.  Progress as tolerated.     Rogelio is progressing well towards his goals.   Pt prognosis is Good.     Pt will continue to benefit from skilled outpatient physical therapy to address the deficits listed in the problem list box on initial evaluation, provide pt/family education and to maximize pt's level of independence in the home and community environment.     Pt's spiritual, cultural and educational needs considered and pt agreeable to plan of care and goals.    Anticipated barriers to physical therapy:     Goals: GOALS: Short Term Goals:  8 weeks  1.Report  decreased R knee pain  < / =  3-4/10  to increase " tolerance for walking, driving, ADLs.(not today, progressing)  2. Increase knee ROM to 8 hyperextension to 140 degrees flexion in order to be able to perform ADLs(not today, progressing) without difficulty.  3. Increase strength to 3+/5 MMT grade in R LE to increase tolerance for ADL and work activities.(not today, progressing)  4. Pt to tolerate HEP to improve ROM and independence with ADL's(not today, progressing)     Long Term Goals: 16 weeks  1.Report decreased R knee pain < / = 0-1/10  to increase tolerance for recreational activities(not today, progressing)  2.Patient goal: To return to being physically active in sports (bike racing, triathlons, rock climbing) /yoga.(not today, progressing)    3.Increase strength to >/= 4+/5 in R LE  to increase tolerance for ADL and work activities.(not today, progressing)  4. Pt will be able to perform SL squat without compensation or c/o pain in order to return to recreational activities      Plan     Continue with POC per protocol, focusing on agilities and yoga type activities.    Taty Campos, PT, DPT, OCS

## 2019-11-22 ENCOUNTER — CLINICAL SUPPORT (OUTPATIENT)
Dept: REHABILITATION | Facility: HOSPITAL | Age: 49
End: 2019-11-22
Payer: MEDICAID

## 2019-11-22 DIAGNOSIS — M25.561 ACUTE PAIN OF RIGHT KNEE: ICD-10-CM

## 2019-11-22 PROCEDURE — 97110 THERAPEUTIC EXERCISES: CPT

## 2019-11-22 NOTE — PROGRESS NOTES
Physical Therapy Daily Treatment Note     Name: Jv Rankin  [facility] Number: [id number]  Therapy Diagnosis:        Encounter Diagnosis   Name Primary?    Acute pain of right knee        Physician: Roxanne Chin PA-C     Physician Orders: PT Eval and Treat   Medical Diagnosis from Referral: S83.211D (ICD-10-CM) - Bucket-handle tear of medial meniscus of right knee as current injury, subsequent encounter  Evaluation Date: 8/5/2019  Authorization Period Expiration: 12/15/2019  Plan of Care Expiration: 11/25/19  Visit # / Visits authorized: 22/32  Visit # total: 23     Time In: 1100  Time Out: 1200  Total Billable Time: 25 minutes  Tx time 60     Precautions: Standard, Plan initially for toe-touch weight-bearing x 4-6 weeks, flexion 0-90 degrees. Wear brace for 6 weeks. Must be locked in extension when sleeping at night and also when on feet.  Patient given aspirin for DVT prophylaxis.     PROCEDURES PERFORMED on 8/2/19:  1.  Right knee arthroscopic combined all-inside and inside-out medial meniscus repair (CPT 91045, complex modifier-22)  2.  Right knee arthroscopic partial medial meniscectomy (CPT 55443)  3.  Right knee arthroscopic limited synovectomy with notchplasty (CPT 81219)  4.  Right knee arthroscopic chondroplasty (CPT 22827)         Subjective     Pt reports: that he has no pain.  He was compliant with home exercise program.  Response to previous treatment: soreness  Functional change: improved gait with mild limp    Pain: 0/10  Location: right knee      Objective       Rogelio received therapeutic exercises to develop strength, endurance, ROM, flexibility, posture and core stabilization for 55'  minutes including:  Watt bike 5' increased circulation and ROM then  x 10 min- 1 min on/1 min off- increased mm endurance and strength.   Dynamic stretching - HS scoop, Inch worms w/ push up, WGS x 1 lap each  SL RDL on upside down BOSU 2 x 10 B  Ladder drills x 6 way x 2 laps each  Calf stretch on slant x 2  "min      ----not performed today----  Monster walks with GTB 2 laps  Lateral sidestep  GTB 2 laps  Lunges with OH ball x 1 lap  Lunges with med ball twist x 1 lap  Step downs 6" 3 x 10 R  Squats (walking hands down weight rack) 3 x 15  Prone quad stretch 3 x 30"  HSS/HCS w/ strap 3 x 30"   Shuttle 100# DL 3 x 10  Sidelying shuttle 50# SL 3 x 10 R only  Calf raises off step x 30 B           Rogelio received the following manual therapy techniques: Joint mobilizations and Soft tissue Mobilization were applied to the: R knee  for 0 minutes, including: Prone passive knee flexion, HSS, over pressure TKE    CP post treatment x 10 min NP    Home Exercises Provided and Patient Education Provided     Education provided:   Posture awareness, gait training    Written Home Exercises Provided: yes.  Exercises were reviewed and Rogelio was able to demonstrate them prior to the end of the session.  Rogelio demonstrated good  understanding of the education provided.     Assessment     Pt tolerated treatment well today with progression of exercises.  Continues to lack HS flexibility.  Balance is improving.  Worked more on agility ladder drills today to further improve functional mobility.  Decreased coordination noted, however with increased reps, improvements noted.  Progress as tolerated.     Rogelio is progressing well towards his goals.   Pt prognosis is Good.     Pt will continue to benefit from skilled outpatient physical therapy to address the deficits listed in the problem list box on initial evaluation, provide pt/family education and to maximize pt's level of independence in the home and community environment.     Pt's spiritual, cultural and educational needs considered and pt agreeable to plan of care and goals.    Anticipated barriers to physical therapy:     Goals: GOALS: Short Term Goals:  8 weeks  1.Report  decreased R knee pain  < / =  3-4/10  to increase tolerance for walking, driving, ADLs.(not today, progressing)  2. " Increase knee ROM to 8 hyperextension to 140 degrees flexion in order to be able to perform ADLs(not today, progressing) without difficulty.  3. Increase strength to 3+/5 MMT grade in R LE to increase tolerance for ADL and work activities.(not today, progressing)  4. Pt to tolerate HEP to improve ROM and independence with ADL's(not today, progressing)     Long Term Goals: 16 weeks  1.Report decreased R knee pain < / = 0-1/10  to increase tolerance for recreational activities(not today, progressing)  2.Patient goal: To return to being physically active in sports (bike racing, triathlons, rock climbing) /yoga.(not today, progressing)    3.Increase strength to >/= 4+/5 in R LE  to increase tolerance for ADL and work activities.(not today, progressing)  4. Pt will be able to perform SL squat without compensation or c/o pain in order to return to recreational activities      Plan     Continue with POC per protocol, focusing on agilities and yoga type activities.    Taty Campos, PT, DPT, OCS

## 2019-11-26 ENCOUNTER — CLINICAL SUPPORT (OUTPATIENT)
Dept: REHABILITATION | Facility: HOSPITAL | Age: 49
End: 2019-11-26
Payer: MEDICAID

## 2019-11-26 DIAGNOSIS — M25.561 ACUTE PAIN OF RIGHT KNEE: ICD-10-CM

## 2019-11-26 PROCEDURE — 97110 THERAPEUTIC EXERCISES: CPT

## 2019-11-26 NOTE — PROGRESS NOTES
Physical Therapy Daily Treatment Note     Name: Jv Rankin  [facility] Number: [id number]  Therapy Diagnosis:        Encounter Diagnosis   Name Primary?    Acute pain of right knee        Physician: Roxanne Chin PA-C     Physician Orders: PT Eval and Treat   Medical Diagnosis from Referral: S83.211D (ICD-10-CM) - Bucket-handle tear of medial meniscus of right knee as current injury, subsequent encounter  Evaluation Date: 8/5/2019  Authorization Period Expiration: 12/15/2019  Plan of Care Expiration: 12/31/19  Visit # / Visits authorized: 23/32  Visit # total: 24     Time In: 1000  Time Out: 1100  Total Billable Time: 60 minutes  Tx time 60     Precautions: Standard, Plan initially for toe-touch weight-bearing x 4-6 weeks, flexion 0-90 degrees. Wear brace for 6 weeks. Must be locked in extension when sleeping at night and also when on feet.  Patient given aspirin for DVT prophylaxis.     PROCEDURES PERFORMED on 8/2/19:  1.  Right knee arthroscopic combined all-inside and inside-out medial meniscus repair (CPT 60315, complex modifier-22)  2.  Right knee arthroscopic partial medial meniscectomy (CPT 76366)  3.  Right knee arthroscopic limited synovectomy with notchplasty (CPT 54275)  4.  Right knee arthroscopic chondroplasty (CPT 65118)         Subjective     Pt reports: that he has no pain.  He was compliant with home exercise program.  Response to previous treatment: soreness  Functional change: improved gait with mild limp    Pain: 0/10  Location: right knee      Objective       Rogelio received therapeutic exercises to develop strength, endurance, ROM, flexibility, posture and core stabilization for 60'  minutes including:  Watt bike 5' increased circulation and ROM then  x 10 min- 1 min on/1 min off- increased mm endurance and strength.   Dynamic stretching - HS scoop, Inch worms w/ push up, WGS x 1 lap each  SL Warrior 3 on upside down BOSU 2 x 5 B  Tall kneeling (knees apart) sitting on heels into child's pose  "  HS stretch 3 x 30"   FR to HS/quad/ITB/calf x 15-20 each B  Calf stretch on slant x 2 min      ----not performed today----  Ladder drills x 6 way x 2 laps each  Monster walks with GTB 2 laps  Lateral sidestep  GTB 2 laps  Lunges with OH ball x 1 lap  Lunges with med ball twist x 1 lap  Step downs 6" 3 x 10 R  Squats (walking hands down weight rack) 3 x 15  Prone quad stretch 3 x 30"  HSS/HCS w/ strap 3 x 30"   Shuttle 100# DL 3 x 10  Sidelying shuttle 50# SL 3 x 10 R only  Calf raises off step x 30 B           Rogelio received the following manual therapy techniques: Joint mobilizations and Soft tissue Mobilization were applied to the: R knee  for 0 minutes, including: Prone passive knee flexion, HSS, over pressure TKE    CP post treatment x 10 min NP    Home Exercises Provided and Patient Education Provided     Education provided:   Posture awareness, gait training    Written Home Exercises Provided: yes.  Exercises were reviewed and Rogelio was able to demonstrate them prior to the end of the session.  Rogelio demonstrated good  understanding of the education provided.     Assessment     Pt tolerated treatment well today with progression of exercises.  Challenged pt with yoga poses on BOSU today.  Pt continues to lack full knee flexion, therefore worked on stretching and sitting back on heels to further improve range.  Progress as tolerated.    Rogelio is progressing well towards his goals.   Pt prognosis is Good.     Pt will continue to benefit from skilled outpatient physical therapy to address the deficits listed in the problem list box on initial evaluation, provide pt/family education and to maximize pt's level of independence in the home and community environment.     Pt's spiritual, cultural and educational needs considered and pt agreeable to plan of care and goals.    Anticipated barriers to physical therapy:     Goals: GOALS: Short Term Goals:  8 weeks  1.Report  decreased R knee pain  < / =  3-4/10  to " increase tolerance for walking, driving, ADLs.MET 11/26/19  2. Increase knee ROM to 8 hyperextension to 140 degrees flexion in order to be able to perform ADLs without difficulty. Progressing 11/26/19  3. Increase strength to 3+/5 MMT grade in R LE to increase tolerance for ADL and work activities. MET 11/26/19  4. Pt to tolerate HEP to improve ROM and independence with ADL's MET 11/26/19     Long Term Goals: 16 weeks  1.Report decreased R knee pain < / = 0-1/10  to increase tolerance for recreational activities(not today, progressing)  2.Patient goal: To return to being physically active in sports (bike racing, triathlons, rock climbing) /yoga.(not today, progressing)    3.Increase strength to >/= 4+/5 in R LE  to increase tolerance for ADL and work activities.(not today, progressing)  4. Pt will be able to perform SL squat without compensation or c/o pain in order to return to recreational activities      Plan     Continue with POC per protocol, focusing on agilities and yoga type activities.    Taty Campos, PT, DPT, OCS

## 2019-12-05 ENCOUNTER — CLINICAL SUPPORT (OUTPATIENT)
Dept: REHABILITATION | Facility: HOSPITAL | Age: 49
End: 2019-12-05
Payer: MEDICAID

## 2019-12-05 PROCEDURE — 97110 THERAPEUTIC EXERCISES: CPT

## 2019-12-05 NOTE — PROGRESS NOTES
"  Physical Therapy Daily Treatment Note     Name: Jv Rankin  [facility] Number: [id number]  Therapy Diagnosis:        Encounter Diagnosis   Name Primary?    Acute pain of right knee        Physician: Roxanne Chin PA-C     Physician Orders: PT Eval and Treat   Medical Diagnosis from Referral: S83.211D (ICD-10-CM) - Bucket-handle tear of medial meniscus of right knee as current injury, subsequent encounter  Evaluation Date: 8/5/2019  Authorization Period Expiration: 12/15/2019  Plan of Care Expiration: 12/31/19  Visit # / Visits authorized: 23/32  Visit # total: 24     Time In: 1105  Time Out: 1200  Total Billable Time: 45 minutes  Tx time 55     Precautions: Standard, Plan initially for toe-touch weight-bearing x 4-6 weeks, flexion 0-90 degrees. Wear brace for 6 weeks. Must be locked in extension when sleeping at night and also when on feet.  Patient given aspirin for DVT prophylaxis.     PROCEDURES PERFORMED on 8/2/19:  1.  Right knee arthroscopic combined all-inside and inside-out medial meniscus repair (CPT 39465, complex modifier-22)  2.  Right knee arthroscopic partial medial meniscectomy (CPT 03980)  3.  Right knee arthroscopic limited synovectomy with notchplasty (CPT 49509)  4.  Right knee arthroscopic chondroplasty (CPT 99237)         Subjective     Pt reports: min pain today with sit<>stand  He was compliant with home exercise program.  Response to previous treatment: soreness  Functional change: improved gait with mild limp    Pain: 2/10  Location: right knee      Objective       Rogelio received therapeutic exercises to develop strength, endurance, ROM, flexibility, posture and core stabilization for 45'  minutes including:  Elliptical 12' level 6 intervals for increased ROM, mm endurance and circulation   Dynamic stretching - HS scoop, Inch worms w/ push up, WGS x 2 lap each  SL Warrior 3 on upside down BOSU 2 x 5 B  Tall kneeling (knees apart) sitting on heels into child's pose   HS stretch 3 x 30"   FR " "to HS/quad/ITB/calf x 15-20 each B  Calf stretch on slant x 2 min      ----not performed today----  Ladder drills x 6 way x 2 laps each  Monster walks with GTB 2 laps  Lateral sidestep  GTB 2 laps  Lunges with OH ball x 1 lap  Lunges with med ball twist x 1 lap  Step downs 6" 3 x 10 R  Squats (walking hands down weight rack) 3 x 15  Prone quad stretch 3 x 30"  HSS/HCS w/ strap 3 x 30"   Shuttle 100# DL 3 x 10  Sidelying shuttle 50# SL 3 x 10 R only  Calf raises off step x 30 B           Rogelio received the following manual therapy techniques: Joint mobilizations and Soft tissue Mobilization were applied to the: R knee  for 0 minutes, including: Prone passive knee flexion, HSS, over pressure TKE    CP post treatment x 10 min     Home Exercises Provided and Patient Education Provided     Education provided:   Posture awareness, gait training    Written Home Exercises Provided: yes.  Exercises were reviewed and Rogelio was able to demonstrate them prior to the end of the session.  Rogelio demonstrated good  understanding of the education provided.     Assessment     Pt tolerated treatment well today with progression of exercises. Continued challenge with yoga poses on BOSU today for improved balance and coordination.  Continued with HSS and sitting back on heels for improved knee ext   Progress as tolerated.    Rogelio is progressing well towards his goals.   Pt prognosis is Good.     Pt will continue to benefit from skilled outpatient physical therapy to address the deficits listed in the problem list box on initial evaluation, provide pt/family education and to maximize pt's level of independence in the home and community environment.     Pt's spiritual, cultural and educational needs considered and pt agreeable to plan of care and goals.    Anticipated barriers to physical therapy:     Goals: GOALS: Short Term Goals:  8 weeks  1.Report  decreased R knee pain  < / =  3-4/10  to increase tolerance for walking, driving, " ADLs.MET 11/26/19  2. Increase knee ROM to 8 hyperextension to 140 degrees flexion in order to be able to perform ADLs without difficulty. Progressing 11/26/19  3. Increase strength to 3+/5 MMT grade in R LE to increase tolerance for ADL and work activities. MET 11/26/19  4. Pt to tolerate HEP to improve ROM and independence with ADL's MET 11/26/19     Long Term Goals: 16 weeks  1.Report decreased R knee pain < / = 0-1/10  to increase tolerance for recreational activities(not today, progressing)  2.Patient goal: To return to being physically active in sports (bike racing, triathlons, rock climbing) /yoga.(not today, progressing)    3.Increase strength to >/= 4+/5 in R LE  to increase tolerance for ADL and work activities.(not today, progressing)  4. Pt will be able to perform SL squat without compensation or c/o pain in order to return to recreational activities      Plan     Continue with POC per protocol, focusing on agilities and yoga type activities.    Josue Cole, PTA, STS

## 2019-12-13 ENCOUNTER — CLINICAL SUPPORT (OUTPATIENT)
Dept: REHABILITATION | Facility: HOSPITAL | Age: 49
End: 2019-12-13
Payer: MEDICAID

## 2019-12-13 DIAGNOSIS — M25.561 ACUTE PAIN OF RIGHT KNEE: ICD-10-CM

## 2019-12-13 PROCEDURE — 97110 THERAPEUTIC EXERCISES: CPT

## 2019-12-13 NOTE — PROGRESS NOTES
"  Physical Therapy Daily Treatment Note     Name: Jv Rankin  [facility] Number: [id number]  Therapy Diagnosis:        Encounter Diagnosis   Name Primary?    Acute pain of right knee        Physician: Roxanne Chin PA-C     Physician Orders: PT Eval and Treat   Medical Diagnosis from Referral: S83.211D (ICD-10-CM) - Bucket-handle tear of medial meniscus of right knee as current injury, subsequent encounter  Evaluation Date: 8/5/2019  Authorization Period Expiration: 12/15/2019  Plan of Care Expiration: 12/31/19  Visit # / Visits authorized: 25/32  Visit # total: 26     Time In: 1300  Time Out: 1400  Total Billable Time: 30 minutes  Tx time 55     Precautions: Standard, Plan initially for toe-touch weight-bearing x 4-6 weeks, flexion 0-90 degrees. Wear brace for 6 weeks. Must be locked in extension when sleeping at night and also when on feet.  Patient given aspirin for DVT prophylaxis.     PROCEDURES PERFORMED on 8/2/19:  1.  Right knee arthroscopic combined all-inside and inside-out medial meniscus repair (CPT 42304, complex modifier-22)  2.  Right knee arthroscopic partial medial meniscectomy (CPT 51851)  3.  Right knee arthroscopic limited synovectomy with notchplasty (CPT 21905)  4.  Right knee arthroscopic chondroplasty (CPT 51060)         Subjective     Pt reports: that he woke up with no pain.    He was compliant with home exercise program.  Response to previous treatment: soreness  Functional change: improved gait with mild limp    Pain: 0/10  Location: right knee      Objective       Rogelio received therapeutic exercises to develop strength, endurance, ROM, flexibility, posture and core stabilization for 55'  minutes including:  Watt bike x 5 min warm up then x 10 min (1 min on, 1 min off)   Dynamic stretching - SL HS stretch, standing quad stretch x 2 lap each  SL squat w/ green sports cord at side 3 x 10  Shuttle jumping 2 cords 3 x 30"   Shuttle alt LE hopping 1 cord 3 x 30"   Pilates reformer SL squat 1 " "spring 3 x 10  Pilates reformer SL hip abd 1 spring 3 x 10       ----not performed today----  Ladder drills x 6 way x 2 laps each  Monster walks with GTB 2 laps  Lateral sidestep  GTB 2 laps  Lunges with OH ball x 1 lap  Lunges with med ball twist x 1 lap  Squats (walking hands down weight rack) 3 x 15  Prone quad stretch 3 x 30"  HSS/HCS w/ strap 3 x 30"   Calf raises off step x 30 B  SL Warrior 3 on upside down BOSU 2 x 5 B  Tall kneeling (knees apart) sitting on heels into child's pose   HS stretch 3 x 30"   FR to HS/quad/ITB/calf x 15-20 each B  Calf stretch on slant x 2 min           Rogelio received the following manual therapy techniques: Joint mobilizations and Soft tissue Mobilization were applied to the: R knee  for 0 minutes, including: Prone passive knee flexion, HSS, over pressure TKE    CP post treatment x 10 min NP    Home Exercises Provided and Patient Education Provided     Education provided:   Posture awareness, gait training    Written Home Exercises Provided: yes.  Exercises were reviewed and Rogelio was able to demonstrate them prior to the end of the session.  Rogelio demonstrated good  understanding of the education provided.     Assessment     Pt tolerated treatment well today with progression of exercises. Challenged pt with progression of strengthening and jumping on the shuttle.  Pt with no increased c/o pain.  Pt with min genu valgus during SL squats, however after VCs improvements noted.  Progress as tolerated.    Rogelio is progressing well towards his goals.   Pt prognosis is Good.     Pt will continue to benefit from skilled outpatient physical therapy to address the deficits listed in the problem list box on initial evaluation, provide pt/family education and to maximize pt's level of independence in the home and community environment.     Pt's spiritual, cultural and educational needs considered and pt agreeable to plan of care and goals.    Anticipated barriers to physical therapy: "     Goals: GOALS: Short Term Goals:  8 weeks  1.Report  decreased R knee pain  < / =  3-4/10  to increase tolerance for walking, driving, ADLs.MET 11/26/19  2. Increase knee ROM to 8 hyperextension to 140 degrees flexion in order to be able to perform ADLs without difficulty. Progressing 11/26/19  3. Increase strength to 3+/5 MMT grade in R LE to increase tolerance for ADL and work activities. MET 11/26/19  4. Pt to tolerate HEP to improve ROM and independence with ADL's MET 11/26/19     Long Term Goals: 16 weeks  1.Report decreased R knee pain < / = 0-1/10  to increase tolerance for recreational activities(not today, progressing)  2.Patient goal: To return to being physically active in sports (bike racing, triathlons, rock climbing) /yoga.(not today, progressing)    3.Increase strength to >/= 4+/5 in R LE  to increase tolerance for ADL and work activities.(not today, progressing)  4. Pt will be able to perform SL squat without compensation or c/o pain in order to return to recreational activities      Plan     Continue with POC per protocol, focusing on agilities and yoga type activities.    Taty Campos, PT, DPT, OCS

## 2019-12-20 ENCOUNTER — CLINICAL SUPPORT (OUTPATIENT)
Dept: REHABILITATION | Facility: HOSPITAL | Age: 49
End: 2019-12-20
Payer: MEDICAID

## 2019-12-20 DIAGNOSIS — M25.561 ACUTE PAIN OF RIGHT KNEE: ICD-10-CM

## 2019-12-20 PROCEDURE — 97110 THERAPEUTIC EXERCISES: CPT

## 2019-12-20 NOTE — PROGRESS NOTES
Physical Therapy Daily Treatment Note     Name: Jv Rankin  [facility] Number: [id number]  Therapy Diagnosis:        Encounter Diagnosis   Name Primary?    Acute pain of right knee        Physician: Roxanne Chin PA-C     Physician Orders: PT Eval and Treat   Medical Diagnosis from Referral: S83.211D (ICD-10-CM) - Bucket-handle tear of medial meniscus of right knee as current injury, subsequent encounter  Evaluation Date: 8/5/2019  Authorization Period Expiration: 12/15/2019  Plan of Care Expiration: 12/31/19  Visit # / Visits authorized: 26/32  Visit # total: 27     Time In: 1300  Time Out: 1400  Total Billable Time: 30 minutes  Tx time 55     Precautions: Standard, Plan initially for toe-touch weight-bearing x 4-6 weeks, flexion 0-90 degrees. Wear brace for 6 weeks. Must be locked in extension when sleeping at night and also when on feet.  Patient given aspirin for DVT prophylaxis.     PROCEDURES PERFORMED on 8/2/19:  1.  Right knee arthroscopic combined all-inside and inside-out medial meniscus repair (CPT 94588, complex modifier-22)  2.  Right knee arthroscopic partial medial meniscectomy (CPT 55004)  3.  Right knee arthroscopic limited synovectomy with notchplasty (CPT 86991)  4.  Right knee arthroscopic chondroplasty (CPT 13096)         Subjective     Pt reports: that he woke up with no pain.    He was compliant with home exercise program.  Response to previous treatment: soreness  Functional change: improved gait with mild limp    Pain: 0/10  Location: right knee      Objective     R knee ROM: 8 hyperextension to 140 deg flexion R    Lower Extremity Strength  Right LE  Left LE    Quadriceps: 5/5 Quadriceps: 5/5   Hamstrings: 4/5 Hamstrings: 4/5   Hip flexion (supine): 5/5 Hip flexion (supine): 5/5   Hip extension:  4+/5 Hip extension: 4/5   PGM: 4+/5 PGM:  4/5       Rogelio received therapeutic exercises to develop strength, endurance, ROM, flexibility, posture and core stabilization for 55'  minutes  "including:  Watt bike x 5 min warm up then x 10 min (1 min on, 1 min off)   Dynamic stretching - SL HS stretch, standing quad stretch x 1 lap each  SL squat w/ green sports cord at side 3 x 10  Shuttle jumping 2 cords 3 x 30"   Shuttle alt LE hopping 1 cord 3 x 30"   Shuttle R LE hopping 1 cord 3 x 30"   Bridge w/HSC on ball 3 x 10      ----not performed today----  Ladder drills x 6 way x 2 laps each  Monster walks with GTB 2 laps  Lateral sidestep  GTB 2 laps  Lunges with OH ball x 1 lap  Lunges with med ball twist x 1 lap  Squats (walking hands down weight rack) 3 x 15  Prone quad stretch 3 x 30"  HSS/HCS w/ strap 3 x 30"   Calf raises off step x 30 B  SL Warrior 3 on upside down BOSU 2 x 5 B  Tall kneeling (knees apart) sitting on heels into child's pose   HS stretch 3 x 30"   FR to HS/quad/ITB/calf x 15-20 each B  Calf stretch on slant x 2 min           Rogelio received the following manual therapy techniques: Joint mobilizations and Soft tissue Mobilization were applied to the: R knee  for 0 minutes, including: Prone passive knee flexion, HSS, over pressure TKE    CP post treatment x 10 min NP    Home Exercises Provided and Patient Education Provided     Education provided:   Posture awareness, gait training    Written Home Exercises Provided: yes.  Exercises were reviewed and Rogelio was able to demonstrate them prior to the end of the session.  Rogelio demonstrated good  understanding of the education provided.     Assessment     Re-assessment done today.  Pt has made good progress in physical therapy.  He presents with full ROM, good strength, and good technique throughout functional activities.  Pt is I with HEP and no longer requires skilled physical therapy.  However, would benefit from a RTA program.      Rogelio is progressing well towards his goals.   Pt prognosis is Good.     Pt will continue to benefit from skilled outpatient physical therapy to address the deficits listed in the problem list box on " initial evaluation, provide pt/family education and to maximize pt's level of independence in the home and community environment.     Pt's spiritual, cultural and educational needs considered and pt agreeable to plan of care and goals.    Anticipated barriers to physical therapy:     Goals: GOALS: Short Term Goals:  8 weeks  1.Report  decreased R knee pain  < / =  3-4/10  to increase tolerance for walking, driving, ADLs.MET 11/26/19  2. Increase knee ROM to 8 hyperextension to 140 degrees flexion in order to be able to perform ADLs without difficulty. MET 12/20/19  3. Increase strength to 3+/5 MMT grade in R LE to increase tolerance for ADL and work activities. MET 11/26/19  4. Pt to tolerate HEP to improve ROM and independence with ADL's MET 11/26/19     Long Term Goals: 16 weeks  1.Report decreased R knee pain < / = 0-1/10  to increase tolerance for recreational activities MET 12/20/19  2.Patient goal: To return to being physically active in sports (bike racing, triathlons, rock climbing) /yoga. Partially MET 12/20/19  3.Increase strength to >/= 4+/5 in R LE  to increase tolerance for ADL and work activities. MET 12/20/19  4. Pt will be able to perform SL squat without compensation or c/o pain in order to return to recreational activities MET 12/20/19      Plan     Discharged pt from PT.  Recommend RTA program.    Taty Campos, PT, DPT, OCS

## 2020-01-06 NOTE — PROGRESS NOTES
CC: F/u R Knee     DATE OF PROCEDURE: 8/2/2019   PROCEDURES PERFORMED:   1.  Right knee arthroscopic combined all-inside and inside-out medial meniscus repair  2.  Right knee arthroscopic partial medial meniscectomy  3.  Right knee arthroscopic limited synovectomy with notchplasty  4.  Right knee arthroscopic chondroplasty    Jv Rankin reports to be doing very well 5 mos s/p the above mentioned procedure. Presents today FWB without assistance. Going to PT 2xWeek at the [facility] location. Reports good progress daily. No sig pain complaints. Has not started the running progression quite yet. No swelling issues. No mechanical symptoms. Pleased thus far.     Pain Score:   2    REVIEW OF SYSTEMS:   Constitution: Negative. Negative for chills, fever and night sweats.    Hematologic/Lymphatic: Negative for bleeding problem. Does not bruise/bleed easily.   Skin: Negative for dry skin, itching and rash.   Musculoskeletal: Negative for falls. Positive for right knee pain and muscle weakness.     All other review of symptoms were reviewed and found to be noncontributory.     PAST MEDICAL HISTORY:   History reviewed. No pertinent past medical history.    PAST SURGICAL HISTORY:   Past Surgical History:   Procedure Laterality Date    ARTHROSCOPIC CHONDROPLASTY OF KNEE JOINT  8/2/2019    Procedure: ARTHROSCOPY, KNEE, WITH CHONDROPLASTY;  Surgeon: STEFFANIE Boyd MD;  Location: [facility] OR [address] [place] [address];  Service: Orthopedics;;    KNEE ARTHROSCOPY W/ MENISCECTOMY  8/2/2019    Procedure: ARTHROSCOPY, KNEE, WITH MENISCECTOMY;  Surgeon: STEFFANIE Boyd MD;  Location: [facility] OR [address] [place] [address];  Service: Orthopedics;;    REPAIR OF MENISCUS OF KNEE Right 8/2/2019    Procedure: ARTHROSCOPY, WITH MENISCUS REPAIR, (MEDIAL OR LATERAL);  Surgeon: STEFFANIE Boyd MD;  Location: [address];  Service: Orthopedics;  Laterality: Right;  REGIONAL W/ CATHETER, ADDUCTOR    ARTHROSCOPY, WITH MENISCUS REPAIR, (MEDIAL OR LATERAL)  Brian/Ingrid  notified 7-29 lo    SHOULDER SURGERY Right     SYNOVECTOMY OF KNEE  8/2/2019    Procedure: SYNOVECTOMY, KNEE;  Surgeon: STEFFANIE Boyd MD;  Location: [facility] OR [address] [place] [address];  Service: Orthopedics;;       FAMILY HISTORY:   Family History   Problem Relation Age of Onset    Heart disease Father     Cancer Maternal Grandmother     Cancer Maternal Grandfather         colon       SOCIAL HISTORY:   Social History     Socioeconomic History    Marital status: Significant Other     Spouse name: Not on file    Number of children: Not on file    Years of education: Not on file    Highest education level: Not on file   Occupational History    Occupation: specialty fine arts    Social Needs    Financial resource strain: Not on file    Food insecurity:     Worry: Not on file     Inability: Not on file    Transportation needs:     Medical: Not on file     Non-medical: Not on file   Tobacco Use    Smoking status: Former Smoker    Smokeless tobacco: Never Used   Substance and Sexual Activity    Alcohol use: Not on file    Drug use: Not on file    Sexual activity: Not on file   Lifestyle    Physical activity:     Days per week: Not on file     Minutes per session: Not on file    Stress: Not on file   Relationships    Social connections:     Talks on phone: Not on file     Gets together: Not on file     Attends Latter-day service: Not on file     Active member of club or organization: Not on file     Attends meetings of clubs or organizations: Not on file     Relationship status: Not on file   Other Topics Concern    Not on file   Social History Narrative    Not on file       MEDICATIONS:     Current Outpatient Medications:     aspirin (ECOTRIN) 81 MG EC tablet, Take 1 tablet (81 mg total) by mouth 2 (two) times daily. for 14 days, Disp: 28 tablet, Rfl: 0    ibuprofen (ADVIL,MOTRIN) 800 MG tablet, Take 800 mg by mouth 3 (three) times daily., Disp: , Rfl:     oxyCODONE-acetaminophen (PERCOCET) 5-325 mg per  "tablet, Take 1 tablet by mouth every 6 (six) hours as needed. (Patient not taking: Reported on 10/24/2019), Disp: 28 tablet, Rfl: 0    oxyCODONE-acetaminophen (PERCOCET) 5-325 mg per tablet, Take 1 tablet by mouth every 4 to 6 hours as needed. (Patient not taking: Reported on 10/24/2019), Disp: 28 tablet, Rfl: 0    ALLERGIES:   Review of patient's allergies indicates:   Allergen Reactions    Pcn [penicillins]         PHYSICAL EXAMINATION:  BP (!) 144/88   Pulse 65   Ht 5' 9" (1.753 m)   Wt 79.8 kg (176 lb)   BMI 25.99 kg/m²   General: Well-developed well-nourished 49 y.o. malein no acute distress   Cardiovascular: Regular rhythm by palpation of distal pulse, normal color and temperature, no concerning varicosities on symptomatic side   Lungs: No labored breathing or wheezing appreciated   Neuro: Alert and oriented ×3   Psychiatric: well oriented to person, place and time, demonstrates normal mood and affect   Skin: No rashes, lesions or ulcers, normal temperature, turgor, and texture on involved extremity    Ortho/SPM Exam  Examination of the right knee demonstrates well healed incisions. No signs of infection. No significant pain or unusual tenderness. Non tender along the medial joint line. Good quad bulk and tone. Still some weakness. Full active extension. Passive flexion to 130 without pain. Appropriate patellar mobility. NVI. Intact saphenous nerve function    IMAGING: No new images.      ASSESSMENT:      ICD-10-CM ICD-9-CM   1. Quadriceps weakness M62.81 728.87   2. S/P right knee arthroscopy Z98.890 V45.89     PLAN:     Arthroscopic pictures were reviewed with the patient. He has rehabbed the knee quite well. May begin running progression now with the direction of his therapist. He is quite active - hobbies include surfing, cycle cross, rock climbing and skiing. Will sport cord test prior to clearance around th 6 month breonna. RTC in 2 months for likely final check. All questions answered. "     Procedures

## 2020-01-07 ENCOUNTER — OFFICE VISIT (OUTPATIENT)
Dept: SPORTS MEDICINE | Facility: CLINIC | Age: 50
End: 2020-01-07
Payer: MEDICAID

## 2020-01-07 VITALS
HEART RATE: 65 BPM | WEIGHT: 176 LBS | HEIGHT: 69 IN | BODY MASS INDEX: 26.07 KG/M2 | DIASTOLIC BLOOD PRESSURE: 88 MMHG | SYSTOLIC BLOOD PRESSURE: 144 MMHG

## 2020-01-07 DIAGNOSIS — M62.81 QUADRICEPS WEAKNESS: Primary | ICD-10-CM

## 2020-01-07 DIAGNOSIS — Z98.890 S/P RIGHT KNEE ARTHROSCOPY: ICD-10-CM

## 2020-01-07 PROCEDURE — 99999 PR PBB SHADOW E&M-EST. PATIENT-LVL III: CPT | Mod: PBBFAC,,, | Performed by: ORTHOPAEDIC SURGERY

## 2020-01-07 PROCEDURE — 99999 PR PBB SHADOW E&M-EST. PATIENT-LVL III: ICD-10-PCS | Mod: PBBFAC,,, | Performed by: ORTHOPAEDIC SURGERY

## 2020-01-07 PROCEDURE — 99213 OFFICE O/P EST LOW 20 MIN: CPT | Mod: PBBFAC | Performed by: ORTHOPAEDIC SURGERY

## 2020-01-07 PROCEDURE — 99213 OFFICE O/P EST LOW 20 MIN: CPT | Mod: S$PBB,,, | Performed by: ORTHOPAEDIC SURGERY

## 2020-01-07 PROCEDURE — 99213 PR OFFICE/OUTPT VISIT, EST, LEVL III, 20-29 MIN: ICD-10-PCS | Mod: S$PBB,,, | Performed by: ORTHOPAEDIC SURGERY

## 2020-03-04 ENCOUNTER — PATIENT MESSAGE (OUTPATIENT)
Dept: SPORTS MEDICINE | Facility: CLINIC | Age: 50
End: 2020-03-04

## 2020-03-04 ENCOUNTER — TELEPHONE (OUTPATIENT)
Dept: SPORTS MEDICINE | Facility: CLINIC | Age: 50
End: 2020-03-04

## 2020-03-04 NOTE — TELEPHONE ENCOUNTER
----- Message from Carroll Vazquez sent at 3/4/2020  2:40 PM CST -----  Contact: self  Mg Pt needs Ms patel to give him a call concerning scheduling an appt     Contact

## 2020-03-04 NOTE — TELEPHONE ENCOUNTER
Spoke to patient to schedule his appointment for his final check with Dr. Boyd, his last progress note.

## 2020-03-11 NOTE — PROGRESS NOTES
CC: F/u R Knee     DATE OF PROCEDURE: 8/2/2019   PROCEDURES PERFORMED:   1.  Right knee arthroscopic combined all-inside and inside-out medial meniscus repair  2.  Right knee arthroscopic partial medial meniscectomy  3.  Right knee arthroscopic limited synovectomy with notchplasty  4.  Right knee arthroscopic chondroplasty    Jv Rankin reports to be doing very well 7 mos s/p the above mentioned procedure. Presents today FWB without assistance. Going to PT 2xWeek at the [facility] location. Reports good progress daily. No sig pain complaints. Last office visit we discussed starting a running program and sport cord testing around the 6 month breonna. He is quite active - hobbies include surfing, cycle cross, rock climbing and skiing. No swelling issues. No mechanical symptoms. Overall very pleased.    REVIEW OF SYSTEMS:   Constitution: Negative. Negative for chills, fever and night sweats.    Hematologic/Lymphatic: Negative for bleeding problem. Does not bruise/bleed easily.   Skin: Negative for dry skin, itching and rash.   Musculoskeletal: Negative for falls. Neg for right knee pain and muscle weakness.     All other review of symptoms were reviewed and found to be noncontributory.     PAST MEDICAL HISTORY:   History reviewed. No pertinent past medical history.    PAST SURGICAL HISTORY:   Past Surgical History:   Procedure Laterality Date    ARTHROSCOPIC CHONDROPLASTY OF KNEE JOINT  8/2/2019    Procedure: ARTHROSCOPY, KNEE, WITH CHONDROPLASTY;  Surgeon: STEFFANIE Boyd MD;  Location: [address];  Service: Orthopedics;;    KNEE ARTHROSCOPY W/ MENISCECTOMY  8/2/2019    Procedure: ARTHROSCOPY, KNEE, WITH MENISCECTOMY;  Surgeon: STEFFANIE Boyd MD;  Location: [facility] OR [address] [place] [address];  Service: Orthopedics;;    REPAIR OF MENISCUS OF KNEE Right 8/2/2019    Procedure: ARTHROSCOPY, WITH MENISCUS REPAIR, (MEDIAL OR LATERAL);  Surgeon: STEFFANIE Boyd MD;  Location: [facility] OR [address] [place] [address];  Service: Orthopedics;  Laterality:  Right;  REGIONAL W/ CATHETER, ADDUCTOR    ARTHROSCOPY, WITH MENISCUS REPAIR, (MEDIAL OR LATERAL)  Brian/Ingrid notified 7-29 lo    SHOULDER SURGERY Right     SYNOVECTOMY OF KNEE  8/2/2019    Procedure: SYNOVECTOMY, KNEE;  Surgeon: STEFFANIE Boyd MD;  Location: [facility] OR [address] [place] [address];  Service: Orthopedics;;       FAMILY HISTORY:   Family History   Problem Relation Age of Onset    Heart disease Father     Cancer Maternal Grandmother     Cancer Maternal Grandfather         colon       SOCIAL HISTORY:   Social History     Socioeconomic History    Marital status: Significant Other     Spouse name: Not on file    Number of children: Not on file    Years of education: Not on file    Highest education level: Not on file   Occupational History    Occupation: specialty fine arts    Social Needs    Financial resource strain: Not on file    Food insecurity:     Worry: Not on file     Inability: Not on file    Transportation needs:     Medical: Not on file     Non-medical: Not on file   Tobacco Use    Smoking status: Former Smoker    Smokeless tobacco: Never Used   Substance and Sexual Activity    Alcohol use: Not on file    Drug use: Not on file    Sexual activity: Not on file   Lifestyle    Physical activity:     Days per week: Not on file     Minutes per session: Not on file    Stress: Not on file   Relationships    Social connections:     Talks on phone: Not on file     Gets together: Not on file     Attends Oriental orthodox service: Not on file     Active member of club or organization: Not on file     Attends meetings of clubs or organizations: Not on file     Relationship status: Not on file   Other Topics Concern    Not on file   Social History Narrative    Not on file       MEDICATIONS:     Current Outpatient Medications:     aspirin (ECOTRIN) 81 MG EC tablet, Take 1 tablet (81 mg total) by mouth 2 (two) times daily. for 14 days, Disp: 28 tablet, Rfl: 0    ibuprofen (ADVIL,MOTRIN) 800 MG  "tablet, Take 800 mg by mouth 3 (three) times daily., Disp: , Rfl:     oxyCODONE-acetaminophen (PERCOCET) 5-325 mg per tablet, Take 1 tablet by mouth every 6 (six) hours as needed. (Patient not taking: Reported on 10/24/2019), Disp: 28 tablet, Rfl: 0    oxyCODONE-acetaminophen (PERCOCET) 5-325 mg per tablet, Take 1 tablet by mouth every 4 to 6 hours as needed. (Patient not taking: Reported on 10/24/2019), Disp: 28 tablet, Rfl: 0    ALLERGIES:   Review of patient's allergies indicates:   Allergen Reactions    Pcn [penicillins]         PHYSICAL EXAMINATION:  /75   Pulse (!) 58   Ht 5' 9" (1.753 m)   Wt 79.8 kg (176 lb)   BMI 25.99 kg/m²   General: Well-developed well-nourished 49 y.o. malein no acute distress   Cardiovascular: Regular rhythm by palpation of distal pulse, normal color and temperature, no concerning varicosities on symptomatic side   Lungs: No labored breathing or wheezing appreciated   Neuro: Alert and oriented ×3   Psychiatric: well oriented to person, place and time, demonstrates normal mood and affect   Skin: No rashes, lesions or ulcers, normal temperature, turgor, and texture on involved extremity    Ortho/SPM Exam  Examination of the right knee demonstrates well healed incisions. No signs of infection. No significant pain or unusual tenderness. Non tender along the medial joint line. Good quad bulk and tone. Still some mild weakness. Full active extension. Passive flexion to 130 without pain. Appropriate patellar mobility. NVI. Intact saphenous nerve function    IMAGING: No new images.      ASSESSMENT:      ICD-10-CM ICD-9-CM   1. Quadriceps weakness M62.81 728.87        PLAN:     Will complete [facility] Sport Cord test here at [facility]. Referral placed. Scheduled for 3/20/2020. RTC as needed. All questions answered.     Procedures    "

## 2020-03-12 ENCOUNTER — OFFICE VISIT (OUTPATIENT)
Dept: SPORTS MEDICINE | Facility: CLINIC | Age: 50
End: 2020-03-12
Payer: MEDICAID

## 2020-03-12 VITALS
SYSTOLIC BLOOD PRESSURE: 112 MMHG | WEIGHT: 176 LBS | HEART RATE: 58 BPM | BODY MASS INDEX: 26.07 KG/M2 | HEIGHT: 69 IN | DIASTOLIC BLOOD PRESSURE: 75 MMHG

## 2020-03-12 DIAGNOSIS — M62.81 QUADRICEPS WEAKNESS: Primary | ICD-10-CM

## 2020-03-12 PROCEDURE — 99999 PR PBB SHADOW E&M-EST. PATIENT-LVL III: CPT | Mod: PBBFAC,,, | Performed by: ORTHOPAEDIC SURGERY

## 2020-03-12 PROCEDURE — 99213 OFFICE O/P EST LOW 20 MIN: CPT | Mod: S$PBB,,, | Performed by: ORTHOPAEDIC SURGERY

## 2020-03-12 PROCEDURE — 99999 PR PBB SHADOW E&M-EST. PATIENT-LVL III: ICD-10-PCS | Mod: PBBFAC,,, | Performed by: ORTHOPAEDIC SURGERY

## 2020-03-12 PROCEDURE — 99213 OFFICE O/P EST LOW 20 MIN: CPT | Mod: PBBFAC | Performed by: ORTHOPAEDIC SURGERY

## 2020-03-12 PROCEDURE — 99213 PR OFFICE/OUTPT VISIT, EST, LEVL III, 20-29 MIN: ICD-10-PCS | Mod: S$PBB,,, | Performed by: ORTHOPAEDIC SURGERY

## 2020-03-18 ENCOUNTER — TELEPHONE (OUTPATIENT)
Dept: REHABILITATION | Facility: HOSPITAL | Age: 50
End: 2020-03-18

## 2020-03-18 NOTE — TELEPHONE ENCOUNTER
Spoke with patient due to therapy following updates regarding COVID-19 closely and taking every precaution to ensure the safety of our patients, staff and community.  In an abundance of caution and in an effort to help reduce risk and limit community spread, we have decided to temporarily postpone appointments for patients who may be at increased risk to attend in-person therapy or where therapy is not critically needed at this time. Plan of care and home exercise program were reviewed and patient has what they need to continue therapy at home. All patient questions were answered. Also stated to patient that we are exploring virtual methods of providing care and will be in touch over the next few weeks. Patient verbalized understanding to all.

## 2021-03-05 ENCOUNTER — PATIENT MESSAGE (OUTPATIENT)
Dept: SPORTS MEDICINE | Facility: CLINIC | Age: 51
End: 2021-03-05

## (undated) DEVICE — DRESSING SPONGE 8PLY 4X4 STRL

## (undated) DEVICE — BLADE SHAVER 4.5 6/BX

## (undated) DEVICE — SUT VICRYL CTD 2-0 GI 27 SH

## (undated) DEVICE — GAUZE SPONGE 4X4 12PLY

## (undated) DEVICE — SYS CLSR DERMABOND PRINEO 22CM

## (undated) DEVICE — SEE MEDLINE ITEM 146292

## (undated) DEVICE — DRESSING XEROFORM 1X8IN

## (undated) DEVICE — PUSHER CUTTER KNOT FAST FX STR

## (undated) DEVICE — APPLICATOR CHLORAPREP ORN 26ML

## (undated) DEVICE — DRAPE PLASTIC U 60X72

## (undated) DEVICE — BANDAGE ACE ELASTIC 6"

## (undated) DEVICE — PAD ABD 8X10 STERILE

## (undated) DEVICE — NDL 18GA X1 1/2 REG BEVEL

## (undated) DEVICE — PAD ABDOMINAL 5X9 STERILE

## (undated) DEVICE — PAD COLD THERAPY KNEE WRAP ON

## (undated) DEVICE — SYS NDL DELIVERY FAST FIX 360
Type: IMPLANTABLE DEVICE | Site: KNEE | Status: NON-FUNCTIONAL
Removed: 2019-08-02

## (undated) DEVICE — NDL SPINAL 18GX3.5 SPINOCAN

## (undated) DEVICE — DRAPE STERI INSTRUMENT 1018

## (undated) DEVICE — SUT VICRYL 3-0 27 SH

## (undated) DEVICE — SUT HIFI DBL ARMED MENISCAL

## (undated) DEVICE — STOCKING ANTI-EMBOLIC LG LONG

## (undated) DEVICE — PROBE ARTHSCP EDGE ENERGY 50

## (undated) DEVICE — SUT MONOCRYL 3-0 PS-2 UND

## (undated) DEVICE — GOWN SMART IMP BREATHABLE XXLG

## (undated) DEVICE — TUBE SET INFLOW/OUTFLOW

## (undated) DEVICE — SUT ETHICON 3-0 BLK MONO PS

## (undated) DEVICE — DRESSING XEROFORM FOIL PK 1X8

## (undated) DEVICE — SEE MEDLINE ITEM 146313

## (undated) DEVICE — TRAY ARTHROSCOPY 2/CS

## (undated) DEVICE — SEE MEDLINE ITEM 157150

## (undated) DEVICE — SOL IRR NACL .9% 3000ML

## (undated) DEVICE — ADHESIVE DERMABOND ADVANCED

## (undated) DEVICE — BRACE KNEE T SCOPE PREMIER

## (undated) DEVICE — DRAPE EMERALD 87X114.75X113

## (undated) DEVICE — PUMP COLD THERAPY

## (undated) DEVICE — SYR 30CC LUER LOCK

## (undated) DEVICE — TOURNIQUET SB QC DP 34X4IN